# Patient Record
Sex: FEMALE | Race: WHITE | Employment: UNEMPLOYED | URBAN - METROPOLITAN AREA
[De-identification: names, ages, dates, MRNs, and addresses within clinical notes are randomized per-mention and may not be internally consistent; named-entity substitution may affect disease eponyms.]

---

## 2017-08-24 ENCOUNTER — HOSPITAL ENCOUNTER (EMERGENCY)
Age: 57
Discharge: ANOTHER HOSPITAL | End: 2017-08-25
Attending: EMERGENCY MEDICINE
Payer: SELF-PAY

## 2017-08-24 DIAGNOSIS — S92.354A CLOSED NONDISPLACED FRACTURE OF FIFTH METATARSAL BONE OF RIGHT FOOT, INITIAL ENCOUNTER: ICD-10-CM

## 2017-08-24 DIAGNOSIS — S01.311A EAR LOBE LACERATION, RIGHT, INITIAL ENCOUNTER: Primary | ICD-10-CM

## 2017-08-24 DIAGNOSIS — R55 SYNCOPE AND COLLAPSE: ICD-10-CM

## 2017-08-24 PROCEDURE — 96375 TX/PRO/DX INJ NEW DRUG ADDON: CPT

## 2017-08-24 PROCEDURE — 75810000053 HC SPLINT APPLICATION

## 2017-08-24 PROCEDURE — 90471 IMMUNIZATION ADMIN: CPT

## 2017-08-24 PROCEDURE — 96365 THER/PROPH/DIAG IV INF INIT: CPT

## 2017-08-24 PROCEDURE — 96361 HYDRATE IV INFUSION ADD-ON: CPT

## 2017-08-24 PROCEDURE — 99285 EMERGENCY DEPT VISIT HI MDM: CPT

## 2017-08-25 ENCOUNTER — APPOINTMENT (OUTPATIENT)
Dept: GENERAL RADIOLOGY | Age: 57
End: 2017-08-25
Attending: EMERGENCY MEDICINE
Payer: SELF-PAY

## 2017-08-25 ENCOUNTER — APPOINTMENT (OUTPATIENT)
Dept: CT IMAGING | Age: 57
End: 2017-08-25
Attending: EMERGENCY MEDICINE
Payer: SELF-PAY

## 2017-08-25 VITALS
DIASTOLIC BLOOD PRESSURE: 80 MMHG | RESPIRATION RATE: 23 BRPM | BODY MASS INDEX: 23.54 KG/M2 | SYSTOLIC BLOOD PRESSURE: 122 MMHG | WEIGHT: 150 LBS | OXYGEN SATURATION: 97 % | HEART RATE: 72 BPM | HEIGHT: 67 IN | TEMPERATURE: 97.5 F

## 2017-08-25 LAB
ANION GAP SERPL CALC-SCNC: 6 MMOL/L (ref 3–18)
ATRIAL RATE: 74 BPM
BASOPHILS # BLD: 0 K/UL (ref 0–0.06)
BASOPHILS NFR BLD: 1 % (ref 0–2)
BUN SERPL-MCNC: 23 MG/DL (ref 7–18)
BUN/CREAT SERPL: 18 (ref 12–20)
CALCIUM SERPL-MCNC: 9 MG/DL (ref 8.5–10.1)
CALCULATED P AXIS, ECG09: 66 DEGREES
CALCULATED R AXIS, ECG10: 72 DEGREES
CALCULATED T AXIS, ECG11: 66 DEGREES
CHLORIDE SERPL-SCNC: 103 MMOL/L (ref 100–108)
CK MB CFR SERPL CALC: NORMAL % (ref 0–4)
CK MB SERPL-MCNC: <1 NG/ML (ref 5–25)
CK SERPL-CCNC: 114 U/L (ref 26–192)
CO2 SERPL-SCNC: 31 MMOL/L (ref 21–32)
CREAT SERPL-MCNC: 1.25 MG/DL (ref 0.6–1.3)
DIAGNOSIS, 93000: NORMAL
DIFFERENTIAL METHOD BLD: ABNORMAL
EOSINOPHIL # BLD: 0.2 K/UL (ref 0–0.4)
EOSINOPHIL NFR BLD: 3 % (ref 0–5)
ERYTHROCYTE [DISTWIDTH] IN BLOOD BY AUTOMATED COUNT: 13.9 % (ref 11.6–14.5)
GLUCOSE SERPL-MCNC: 136 MG/DL (ref 74–99)
HCT VFR BLD AUTO: 35.3 % (ref 35–45)
HGB BLD-MCNC: 11.5 G/DL (ref 12–16)
LYMPHOCYTES # BLD: 2.1 K/UL (ref 0.9–3.6)
LYMPHOCYTES NFR BLD: 23 % (ref 21–52)
MCH RBC QN AUTO: 31.1 PG (ref 24–34)
MCHC RBC AUTO-ENTMCNC: 32.6 G/DL (ref 31–37)
MCV RBC AUTO: 95.4 FL (ref 74–97)
MONOCYTES # BLD: 0.8 K/UL (ref 0.05–1.2)
MONOCYTES NFR BLD: 9 % (ref 3–10)
NEUTS SEG # BLD: 5.7 K/UL (ref 1.8–8)
NEUTS SEG NFR BLD: 64 % (ref 40–73)
P-R INTERVAL, ECG05: 150 MS
PLATELET # BLD AUTO: 304 K/UL (ref 135–420)
PMV BLD AUTO: 10.4 FL (ref 9.2–11.8)
POTASSIUM SERPL-SCNC: 4.2 MMOL/L (ref 3.5–5.5)
Q-T INTERVAL, ECG07: 402 MS
QRS DURATION, ECG06: 84 MS
QTC CALCULATION (BEZET), ECG08: 446 MS
RBC # BLD AUTO: 3.7 M/UL (ref 4.2–5.3)
SODIUM SERPL-SCNC: 140 MMOL/L (ref 136–145)
TROPONIN I SERPL-MCNC: <0.02 NG/ML (ref 0–0.06)
VENTRICULAR RATE, ECG03: 74 BPM
WBC # BLD AUTO: 8.8 K/UL (ref 4.6–13.2)

## 2017-08-25 PROCEDURE — 90715 TDAP VACCINE 7 YRS/> IM: CPT | Performed by: EMERGENCY MEDICINE

## 2017-08-25 PROCEDURE — 70450 CT HEAD/BRAIN W/O DYE: CPT

## 2017-08-25 PROCEDURE — 73630 X-RAY EXAM OF FOOT: CPT

## 2017-08-25 PROCEDURE — 74011250636 HC RX REV CODE- 250/636: Performed by: EMERGENCY MEDICINE

## 2017-08-25 PROCEDURE — 74011000258 HC RX REV CODE- 258: Performed by: EMERGENCY MEDICINE

## 2017-08-25 PROCEDURE — 82550 ASSAY OF CK (CPK): CPT | Performed by: EMERGENCY MEDICINE

## 2017-08-25 PROCEDURE — 85025 COMPLETE CBC W/AUTO DIFF WBC: CPT | Performed by: EMERGENCY MEDICINE

## 2017-08-25 PROCEDURE — 93005 ELECTROCARDIOGRAM TRACING: CPT

## 2017-08-25 PROCEDURE — 72125 CT NECK SPINE W/O DYE: CPT

## 2017-08-25 PROCEDURE — 80048 BASIC METABOLIC PNL TOTAL CA: CPT | Performed by: EMERGENCY MEDICINE

## 2017-08-25 RX ORDER — SODIUM CHLORIDE 9 MG/ML
1000 INJECTION, SOLUTION INTRAVENOUS ONCE
Status: COMPLETED | OUTPATIENT
Start: 2017-08-25 | End: 2017-08-25

## 2017-08-25 RX ORDER — CEFAZOLIN SODIUM 1 G/3ML
1 INJECTION, POWDER, FOR SOLUTION INTRAMUSCULAR; INTRAVENOUS
Status: DISCONTINUED | OUTPATIENT
Start: 2017-08-25 | End: 2017-08-25

## 2017-08-25 RX ORDER — ONDANSETRON 2 MG/ML
4 INJECTION INTRAMUSCULAR; INTRAVENOUS
Status: COMPLETED | OUTPATIENT
Start: 2017-08-25 | End: 2017-08-25

## 2017-08-25 RX ORDER — MORPHINE SULFATE 4 MG/ML
4 INJECTION, SOLUTION INTRAMUSCULAR; INTRAVENOUS
Status: COMPLETED | OUTPATIENT
Start: 2017-08-25 | End: 2017-08-25

## 2017-08-25 RX ADMIN — CEFAZOLIN SODIUM 1 G: 1 INJECTION, POWDER, FOR SOLUTION INTRAMUSCULAR; INTRAVENOUS at 02:15

## 2017-08-25 RX ADMIN — TETANUS TOXOID, REDUCED DIPHTHERIA TOXOID AND ACELLULAR PERTUSSIS VACCINE, ADSORBED 0.5 ML: 5; 2.5; 8; 8; 2.5 SUSPENSION INTRAMUSCULAR at 00:16

## 2017-08-25 RX ADMIN — Medication 4 MG: at 02:15

## 2017-08-25 RX ADMIN — ONDANSETRON 4 MG: 2 INJECTION INTRAMUSCULAR; INTRAVENOUS at 02:15

## 2017-08-25 RX ADMIN — SODIUM CHLORIDE 1000 ML: 900 INJECTION, SOLUTION INTRAVENOUS at 00:16
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
## 2017-08-25 NOTE — ED NOTES
Patient states that she wishes to have pain medication now, stating pain to right ear and right foot are at a 6/10. Will continue to monitor.

## 2017-08-25 NOTE — ED TRIAGE NOTES
Pt had syncopal episode earlier, fell and injured right ear and right foot. Bleeding controlled at present, large through and through wound noted to top of right ear. Also complaining of right foot pain after fall.

## 2019-07-27 PROBLEM — N81.6 CYSTOCELE WITH RECTOCELE: Status: ACTIVE | Noted: 2019-07-27

## 2019-07-27 PROBLEM — N81.10 CYSTOCELE WITH RECTOCELE: Status: ACTIVE | Noted: 2019-07-27

## 2019-10-16 ENCOUNTER — OFFICE VISIT (OUTPATIENT)
Dept: ORTHOPEDIC SURGERY | Age: 59
End: 2019-10-16

## 2019-10-16 VITALS
WEIGHT: 126 LBS | OXYGEN SATURATION: 100 % | RESPIRATION RATE: 16 BRPM | SYSTOLIC BLOOD PRESSURE: 121 MMHG | HEART RATE: 76 BPM | BODY MASS INDEX: 19.78 KG/M2 | HEIGHT: 67 IN | DIASTOLIC BLOOD PRESSURE: 80 MMHG

## 2019-10-16 DIAGNOSIS — S46.011A TRAUMATIC TEAR OF RIGHT ROTATOR CUFF, UNSPECIFIED TEAR EXTENT, INITIAL ENCOUNTER: Primary | ICD-10-CM

## 2019-10-16 RX ORDER — CYCLOBENZAPRINE HCL 10 MG
10 TABLET ORAL
Qty: 60 TAB | Refills: 0 | Status: SHIPPED | OUTPATIENT
Start: 2019-10-16
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
## 2019-10-18 ENCOUNTER — DOCUMENTATION ONLY (OUTPATIENT)
Dept: ORTHOPEDIC SURGERY | Age: 59
End: 2019-10-18

## 2019-10-28 ENCOUNTER — TELEPHONE (OUTPATIENT)
Dept: ORTHOPEDIC SURGERY | Facility: CLINIC | Age: 59
End: 2019-10-28

## 2019-10-28 RX ORDER — IBUPROFEN 800 MG/1
800 TABLET ORAL
Qty: 90 TAB | Refills: 2 | Status: SHIPPED | OUTPATIENT
Start: 2019-10-28 | End: 2019-11-08 | Stop reason: SDUPTHER

## 2019-10-28 NOTE — TELEPHONE ENCOUNTER
Pt states she is in severe pain and the Flexeril is not helping-it only puts her to sleep. She states she took an 800mg Ibuprofen of her daughter's along with a 500mg Tylenol and that seemed to work. She is requesting an rx for Ibuprofen 800 as she is not wanting anything much stronger. Pt uses [facility]. [place] Please call pt at [phone].

## 2019-10-28 NOTE — TELEPHONE ENCOUNTER
LMOVM to call office back due to generic VM greeting. If/When patient calls back, please make her aware the rx was sent to pharmacy.

## 2019-11-08 ENCOUNTER — OFFICE VISIT (OUTPATIENT)
Dept: ORTHOPEDIC SURGERY | Age: 59
End: 2019-11-08

## 2019-11-08 VITALS
DIASTOLIC BLOOD PRESSURE: 88 MMHG | TEMPERATURE: 95.7 F | HEIGHT: 67 IN | OXYGEN SATURATION: 100 % | WEIGHT: 129.6 LBS | RESPIRATION RATE: 14 BRPM | SYSTOLIC BLOOD PRESSURE: 129 MMHG | HEART RATE: 59 BPM | BODY MASS INDEX: 20.34 KG/M2

## 2019-11-08 DIAGNOSIS — S46.011A TRAUMATIC TEAR OF RIGHT ROTATOR CUFF, UNSPECIFIED TEAR EXTENT, INITIAL ENCOUNTER: ICD-10-CM

## 2019-11-08 DIAGNOSIS — M75.42 IMPINGEMENT SYNDROME OF LEFT SHOULDER: ICD-10-CM

## 2019-11-08 DIAGNOSIS — S46.011D TRAUMATIC COMPLETE TEAR OF RIGHT ROTATOR CUFF, SUBSEQUENT ENCOUNTER: Primary | ICD-10-CM

## 2019-11-08 DIAGNOSIS — M19.111 POST-TRAUMATIC OSTEOARTHRITIS OF RIGHT SHOULDER: ICD-10-CM

## 2019-11-08 RX ORDER — TRAMADOL HYDROCHLORIDE 50 MG/1
50 TABLET ORAL
Qty: 50 TAB | Refills: 0 | Status: SHIPPED | OUTPATIENT
Start: 2019-11-08 | End: 2019-11-15 | Stop reason: SDUPTHER

## 2019-11-08 RX ORDER — IBUPROFEN 800 MG/1
800 TABLET ORAL
Qty: 90 TAB | Refills: 2 | Status: SHIPPED | OUTPATIENT
Start: 2019-11-08 | End: 2019-11-21

## 2019-11-08 NOTE — PROGRESS NOTES
1. Have you been to the ER, urgent care clinic since your last visit? Hospitalized since your last visit? No    2. Have you seen or consulted any other health care providers outside of the [address] [place] [address] since your last visit? Include any pap smears or colon screening.  No
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
## 2019-11-15 ENCOUNTER — TELEPHONE (OUTPATIENT)
Dept: ORTHOPEDIC SURGERY | Age: 59
End: 2019-11-15

## 2019-11-15 DIAGNOSIS — S46.011D TRAUMATIC COMPLETE TEAR OF RIGHT ROTATOR CUFF, SUBSEQUENT ENCOUNTER: Primary | ICD-10-CM

## 2019-11-15 DIAGNOSIS — M67.921 BICEPS TENDINOPATHY, RIGHT: ICD-10-CM

## 2019-11-15 DIAGNOSIS — Z01.812 PRE-OPERATIVE LABORATORY EXAMINATION: ICD-10-CM

## 2019-11-15 DIAGNOSIS — M75.42 IMPINGEMENT SYNDROME OF LEFT SHOULDER: ICD-10-CM

## 2019-11-15 DIAGNOSIS — M19.111 POST-TRAUMATIC OSTEOARTHRITIS OF RIGHT SHOULDER: ICD-10-CM

## 2019-11-15 DIAGNOSIS — S46.011D TRAUMATIC COMPLETE TEAR OF RIGHT ROTATOR CUFF, SUBSEQUENT ENCOUNTER: ICD-10-CM

## 2019-11-15 RX ORDER — TRAMADOL HYDROCHLORIDE 50 MG/1
50 TABLET ORAL
Qty: 20 TAB | Refills: 0 | OUTPATIENT
Start: 2019-11-15 | End: 2019-11-21

## 2019-11-15 NOTE — TELEPHONE ENCOUNTER
rx for #20 Tramadol was approved and sent to [facility] pharmacy. Attempted to contact patient. Had to leave generic VM as there was a generic greeting.

## 2019-11-15 NOTE — TELEPHONE ENCOUNTER
Patient called again regarding the status of her remaining 20 tabs for her prescription. She asked if this can be done today so she can have her medication over the weekend.  Please advise patient at [phone]

## 2019-11-15 NOTE — TELEPHONE ENCOUNTER
Patient called regarding rx for Ultram written 11/8, [facility] only gave patient 30 out of rx written for 50. She's requesting the additional 20 and was hoping to fill before the weekend. A [facility] pharmacy has been added to her file and rx can be called in to them if [facility] cannot fill. Please advise patient at [phone].

## 2019-11-15 NOTE — TELEPHONE ENCOUNTER
Last Visit: 11/8/19 with MD Sutherland  Next Appointment: 12/13/19 with JOSHUA Morel  Previous Refill Encounter(s): 11/8/19 #50 (Pharmacy on filled #28 per )    Requested Prescriptions     Pending Prescriptions Disp Refills    traMADol (ULTRAM) 50 mg tablet 20 Tab 0     Sig: Take 1 Tab by mouth every six (6) hours as needed for Pain for up to 5 days. Max Daily Amount: 200 mg.

## 2019-11-18 NOTE — TELEPHONE ENCOUNTER
Regarding RX:   traMADol (ULTRAM) 50 mg tablet     Preferred Pharmacy:   [facility]/PHARMACY #[id number] - [address]    Patient called very upset stating she was told by a nurse that the refill was sent to [facility] on Friday 11/15. She called [facility] and they never received the RX. I looked at the meds tab and it says it went to [address] [facility]. Patient said she also called [facility] on 11/15 and they never received it either. Patient would like a call back ASAP - she needs the RX sent to the pharmacy listed above.      [phone]

## 2019-11-18 NOTE — TELEPHONE ENCOUNTER
Called [facility] and gave verbal order for the tramadol as ordered.  Attempted to contact patient, but had to leave generic VM as the VM was generic

## 2019-11-20 DIAGNOSIS — S46.011A TRAUMATIC TEAR OF RIGHT ROTATOR CUFF, UNSPECIFIED TEAR EXTENT, INITIAL ENCOUNTER: ICD-10-CM

## 2019-11-21 RX ORDER — CALCIUM CARBONATE 500(1250)
TABLET ORAL DAILY
COMMUNITY

## 2019-11-26 ENCOUNTER — TELEPHONE (OUTPATIENT)
Dept: ORTHOPEDIC SURGERY | Facility: CLINIC | Age: 59
End: 2019-11-26

## 2019-11-26 DIAGNOSIS — S46.011D TRAUMATIC COMPLETE TEAR OF RIGHT ROTATOR CUFF, SUBSEQUENT ENCOUNTER: Primary | ICD-10-CM

## 2019-11-26 RX ORDER — TRAMADOL HYDROCHLORIDE 50 MG/1
50 TABLET ORAL
Qty: 28 TAB | Refills: 0 | Status: SHIPPED | OUTPATIENT
Start: 2019-11-26 | End: 2019-12-03

## 2019-11-26 NOTE — TELEPHONE ENCOUNTER
Patient called checking on status of tramadol refill. I advised it was refilled 11/18/19. She advised she received 20 tablets on 11/18/19 and she has taken them all- she has no more tablets left. She advised she was told to take 1 tablet every 6 hours. I advised would let clinical know.       Patient can be reached: [phone]

## 2019-11-26 NOTE — TELEPHONE ENCOUNTER
rx for tramadol approved. Attempted to contact patient at both numbers provided, and was unable to leave any message at any number.

## 2019-11-26 NOTE — TELEPHONE ENCOUNTER
Patient called and asked if she could have a refill of the traMADol (ULTRAM) 50 mg tablet . and sent to [facility]/PHARMACY #[id number]- [address], [address] Patient states that she is in a lot of pain and having trouble sleeping .  Please advise     Patient tel :[phone]

## 2019-11-27 NOTE — TELEPHONE ENCOUNTER
Prior Auth was submitted. Awaiting results. Patient was called and notified.       Please do not close message

## 2019-11-27 NOTE — TELEPHONE ENCOUNTER
Patient called to advise that [facility] pharmacy just advised her that prior auth is needed for Tramadol refill. She needs rx as soon as possible, please initiate for patient and advise her once at [phone].

## 2019-11-27 NOTE — TELEPHONE ENCOUNTER
Patient called upset about prior auth process stating she doesn't understand, Victoriano Mccain does she have to suffer because of insurance? \". Patient is extremely upset and hung up on me before conversation was finished.

## 2019-11-27 NOTE — TELEPHONE ENCOUNTER
Called and spoke to patient. Advised that I still hadn't heard anything regarding the Prior Auth. Advised that she can pay out of pocket for the medication if she would like.

## 2019-12-02 ENCOUNTER — ANESTHESIA EVENT (OUTPATIENT)
Dept: SURGERY | Age: 59
End: 2019-12-02
Payer: MEDICAID

## 2019-12-03 ENCOUNTER — ANESTHESIA (OUTPATIENT)
Dept: SURGERY | Age: 59
End: 2019-12-03
Payer: MEDICAID

## 2019-12-03 ENCOUNTER — HOSPITAL ENCOUNTER (OUTPATIENT)
Age: 59
Setting detail: OUTPATIENT SURGERY
Discharge: HOME | End: 2019-12-03
Attending: ORTHOPAEDIC SURGERY | Admitting: ORTHOPAEDIC SURGERY
Payer: MEDICAID

## 2019-12-03 VITALS
OXYGEN SATURATION: 93 % | RESPIRATION RATE: 15 BRPM | TEMPERATURE: 97.4 F | BODY MASS INDEX: 20.43 KG/M2 | DIASTOLIC BLOOD PRESSURE: 73 MMHG | SYSTOLIC BLOOD PRESSURE: 103 MMHG | HEART RATE: 60 BPM | WEIGHT: 130.13 LBS | HEIGHT: 67 IN

## 2019-12-03 DIAGNOSIS — G89.18 PAIN FOLLOWING SURGERY OR PROCEDURE: Primary | ICD-10-CM

## 2019-12-03 LAB
AMPHET UR QL SCN: NEGATIVE
BARBITURATES UR QL SCN: POSITIVE
BENZODIAZ UR QL: NEGATIVE
CANNABINOIDS UR QL SCN: POSITIVE
COCAINE UR QL SCN: NEGATIVE
HDSCOM,HDSCOM: ABNORMAL
METHADONE UR QL: NEGATIVE
OPIATES UR QL: NEGATIVE
PCP UR QL: NEGATIVE

## 2019-12-03 PROCEDURE — 76210000021 HC REC RM PH II 0.5 TO 1 HR: Performed by: ORTHOPAEDIC SURGERY

## 2019-12-03 PROCEDURE — 77030004453 HC BUR SHV STRY -B: Performed by: ORTHOPAEDIC SURGERY

## 2019-12-03 PROCEDURE — 77030017964 HC PRB COAG4 STRY -C: Performed by: ORTHOPAEDIC SURGERY

## 2019-12-03 PROCEDURE — 74011000250 HC RX REV CODE- 250: Performed by: NURSE ANESTHETIST, CERTIFIED REGISTERED

## 2019-12-03 PROCEDURE — 77030002919 HC SUT FBRTAPE ARTH -B: Performed by: ORTHOPAEDIC SURGERY

## 2019-12-03 PROCEDURE — 77030013789 HC KT REP BIO TEND ARTH -C: Performed by: ORTHOPAEDIC SURGERY

## 2019-12-03 PROCEDURE — 77030010427: Performed by: ORTHOPAEDIC SURGERY

## 2019-12-03 PROCEDURE — 77030019908 HC STETH ESOPH SIMS -A: Performed by: ANESTHESIOLOGY

## 2019-12-03 PROCEDURE — 74011250636 HC RX REV CODE- 250/636: Performed by: NURSE ANESTHETIST, CERTIFIED REGISTERED

## 2019-12-03 PROCEDURE — C1713 ANCHOR/SCREW BN/BN,TIS/BN: HCPCS | Performed by: ORTHOPAEDIC SURGERY

## 2019-12-03 PROCEDURE — 74011250637 HC RX REV CODE- 250/637: Performed by: NURSE ANESTHETIST, CERTIFIED REGISTERED

## 2019-12-03 PROCEDURE — 74011000272 HC RX REV CODE- 272: Performed by: ORTHOPAEDIC SURGERY

## 2019-12-03 PROCEDURE — 77030002916 HC SUT ETHLN J&J -A: Performed by: ORTHOPAEDIC SURGERY

## 2019-12-03 PROCEDURE — 77030022036 HC BLD SHV TOMCAT STRY -B: Performed by: ORTHOPAEDIC SURGERY

## 2019-12-03 PROCEDURE — 77030040361 HC SLV COMPR DVT MDII -B: Performed by: ORTHOPAEDIC SURGERY

## 2019-12-03 PROCEDURE — 74011250636 HC RX REV CODE- 250/636: Performed by: ANESTHESIOLOGY

## 2019-12-03 PROCEDURE — 77030003666 HC NDL SPINAL BD -A: Performed by: ORTHOPAEDIC SURGERY

## 2019-12-03 PROCEDURE — 74011250636 HC RX REV CODE- 250/636: Performed by: ORTHOPAEDIC SURGERY

## 2019-12-03 PROCEDURE — 77030008574 HC TBNG SUC IRR STRY -B: Performed by: ORTHOPAEDIC SURGERY

## 2019-12-03 PROCEDURE — 76210000006 HC OR PH I REC 0.5 TO 1 HR: Performed by: ORTHOPAEDIC SURGERY

## 2019-12-03 PROCEDURE — 64415 NJX AA&/STRD BRCH PLXS IMG: CPT | Performed by: ANESTHESIOLOGY

## 2019-12-03 PROCEDURE — 77030008683 HC TU ET CUF COVD -A: Performed by: ANESTHESIOLOGY

## 2019-12-03 PROCEDURE — 76942 ECHO GUIDE FOR BIOPSY: CPT | Performed by: ANESTHESIOLOGY

## 2019-12-03 PROCEDURE — 76060000035 HC ANESTHESIA 2 TO 2.5 HR: Performed by: ORTHOPAEDIC SURGERY

## 2019-12-03 PROCEDURE — 77030018836 HC SOL IRR NACL ICUM -A: Performed by: ORTHOPAEDIC SURGERY

## 2019-12-03 PROCEDURE — 77030013079 HC BLNKT BAIR HGGR 3M -A: Performed by: ANESTHESIOLOGY

## 2019-12-03 PROCEDURE — 80307 DRUG TEST PRSMV CHEM ANLYZR: CPT

## 2019-12-03 PROCEDURE — 77030037837: Performed by: ORTHOPAEDIC SURGERY

## 2019-12-03 PROCEDURE — 76010000131 HC OR TIME 2 TO 2.5 HR: Performed by: ORTHOPAEDIC SURGERY

## 2019-12-03 PROCEDURE — 77030019605: Performed by: ORTHOPAEDIC SURGERY

## 2019-12-03 PROCEDURE — 77030040922 HC BLNKT HYPOTHRM STRY -A: Performed by: ORTHOPAEDIC SURGERY

## 2019-12-03 DEVICE — ANCHOR SUT L14.7MM DIA5.5MM 2 NO2 TIGERTAIL FULL THRD: Type: IMPLANTABLE DEVICE | Site: SHOULDER | Status: FUNCTIONAL

## 2019-12-03 DEVICE — ANCHOR SUT L19.1MM DIA7MM BIOCOMPOSITE SWIVELOCK TENODESIS: Type: IMPLANTABLE DEVICE | Site: SHOULDER | Status: FUNCTIONAL

## 2019-12-03 DEVICE — ANCHOR SUTURE BIOCOMP 4.75X19.1 MM SWIVELOCK C: Type: IMPLANTABLE DEVICE | Site: SHOULDER | Status: FUNCTIONAL

## 2019-12-03 RX ORDER — MIDAZOLAM HYDROCHLORIDE 1 MG/ML
INJECTION, SOLUTION INTRAMUSCULAR; INTRAVENOUS
Status: COMPLETED | OUTPATIENT
Start: 2019-12-03 | End: 2019-12-03

## 2019-12-03 RX ORDER — FENTANYL CITRATE 50 UG/ML
INJECTION, SOLUTION INTRAMUSCULAR; INTRAVENOUS
Status: COMPLETED | OUTPATIENT
Start: 2019-12-03 | End: 2019-12-03

## 2019-12-03 RX ORDER — LIDOCAINE HYDROCHLORIDE 10 MG/ML
3 INJECTION, SOLUTION EPIDURAL; INFILTRATION; INTRACAUDAL; PERINEURAL ONCE
Status: COMPLETED | OUTPATIENT
Start: 2019-12-03 | End: 2019-12-03

## 2019-12-03 RX ORDER — DEXAMETHASONE SODIUM PHOSPHATE 4 MG/ML
INJECTION, SOLUTION INTRA-ARTICULAR; INTRALESIONAL; INTRAMUSCULAR; INTRAVENOUS; SOFT TISSUE AS NEEDED
Status: DISCONTINUED | OUTPATIENT
Start: 2019-12-03 | End: 2019-12-03 | Stop reason: HOSPADM

## 2019-12-03 RX ORDER — ROPIVACAINE HYDROCHLORIDE 5 MG/ML
INJECTION, SOLUTION EPIDURAL; INFILTRATION; PERINEURAL
Status: COMPLETED | OUTPATIENT
Start: 2019-12-03 | End: 2019-12-03

## 2019-12-03 RX ORDER — MIDAZOLAM HYDROCHLORIDE 1 MG/ML
2 INJECTION, SOLUTION INTRAMUSCULAR; INTRAVENOUS ONCE
Status: COMPLETED | OUTPATIENT
Start: 2019-12-03 | End: 2019-12-03

## 2019-12-03 RX ORDER — SODIUM CHLORIDE 0.9 % (FLUSH) 0.9 %
5-40 SYRINGE (ML) INJECTION AS NEEDED
Status: CANCELLED | OUTPATIENT
Start: 2019-12-03

## 2019-12-03 RX ORDER — FENTANYL CITRATE 50 UG/ML
50 INJECTION, SOLUTION INTRAMUSCULAR; INTRAVENOUS AS NEEDED
Status: CANCELLED | OUTPATIENT
Start: 2019-12-03

## 2019-12-03 RX ORDER — ONDANSETRON 2 MG/ML
INJECTION INTRAMUSCULAR; INTRAVENOUS AS NEEDED
Status: DISCONTINUED | OUTPATIENT
Start: 2019-12-03 | End: 2019-12-03 | Stop reason: HOSPADM

## 2019-12-03 RX ORDER — PROPOFOL 10 MG/ML
INJECTION, EMULSION INTRAVENOUS AS NEEDED
Status: DISCONTINUED | OUTPATIENT
Start: 2019-12-03 | End: 2019-12-03 | Stop reason: HOSPADM

## 2019-12-03 RX ORDER — ROCURONIUM BROMIDE 10 MG/ML
INJECTION, SOLUTION INTRAVENOUS AS NEEDED
Status: DISCONTINUED | OUTPATIENT
Start: 2019-12-03 | End: 2019-12-03 | Stop reason: HOSPADM

## 2019-12-03 RX ORDER — SUCCINYLCHOLINE CHLORIDE 20 MG/ML
INJECTION INTRAMUSCULAR; INTRAVENOUS AS NEEDED
Status: DISCONTINUED | OUTPATIENT
Start: 2019-12-03 | End: 2019-12-03 | Stop reason: HOSPADM

## 2019-12-03 RX ORDER — SODIUM CHLORIDE 0.9 % (FLUSH) 0.9 %
5-40 SYRINGE (ML) INJECTION EVERY 8 HOURS
Status: CANCELLED | OUTPATIENT
Start: 2019-12-03

## 2019-12-03 RX ORDER — HYDROCODONE BITARTRATE AND ACETAMINOPHEN 5; 325 MG/1; MG/1
1 TABLET ORAL
Qty: 40 TAB | Refills: 0 | Status: SHIPPED | OUTPATIENT
Start: 2019-12-03 | End: 2019-12-04 | Stop reason: SDUPTHER

## 2019-12-03 RX ORDER — SODIUM CHLORIDE 0.9 % (FLUSH) 0.9 %
5-40 SYRINGE (ML) INJECTION EVERY 8 HOURS
Status: DISCONTINUED | OUTPATIENT
Start: 2019-12-03 | End: 2019-12-03 | Stop reason: HOSPADM

## 2019-12-03 RX ORDER — ONDANSETRON 2 MG/ML
4 INJECTION INTRAMUSCULAR; INTRAVENOUS ONCE
Status: CANCELLED | OUTPATIENT
Start: 2019-12-03 | End: 2019-12-03

## 2019-12-03 RX ORDER — SODIUM CHLORIDE 0.9 % (FLUSH) 0.9 %
5-40 SYRINGE (ML) INJECTION AS NEEDED
Status: DISCONTINUED | OUTPATIENT
Start: 2019-12-03 | End: 2019-12-03 | Stop reason: HOSPADM

## 2019-12-03 RX ORDER — FENTANYL CITRATE 50 UG/ML
INJECTION, SOLUTION INTRAMUSCULAR; INTRAVENOUS AS NEEDED
Status: DISCONTINUED | OUTPATIENT
Start: 2019-12-03 | End: 2019-12-03 | Stop reason: HOSPADM

## 2019-12-03 RX ORDER — CEFAZOLIN SODIUM 2 G/50ML
2 SOLUTION INTRAVENOUS
Status: COMPLETED | OUTPATIENT
Start: 2019-12-03 | End: 2019-12-03

## 2019-12-03 RX ORDER — SODIUM CHLORIDE, SODIUM LACTATE, POTASSIUM CHLORIDE, CALCIUM CHLORIDE 600; 310; 30; 20 MG/100ML; MG/100ML; MG/100ML; MG/100ML
75 INJECTION, SOLUTION INTRAVENOUS CONTINUOUS
Status: DISCONTINUED | OUTPATIENT
Start: 2019-12-03 | End: 2019-12-03 | Stop reason: HOSPADM

## 2019-12-03 RX ORDER — HYDROMORPHONE HYDROCHLORIDE 2 MG/ML
1 INJECTION, SOLUTION INTRAMUSCULAR; INTRAVENOUS; SUBCUTANEOUS
Status: CANCELLED | OUTPATIENT
Start: 2019-12-03

## 2019-12-03 RX ORDER — KETOROLAC TROMETHAMINE 15 MG/ML
INJECTION, SOLUTION INTRAMUSCULAR; INTRAVENOUS AS NEEDED
Status: DISCONTINUED | OUTPATIENT
Start: 2019-12-03 | End: 2019-12-03 | Stop reason: HOSPADM

## 2019-12-03 RX ORDER — FENTANYL CITRATE 50 UG/ML
100 INJECTION, SOLUTION INTRAMUSCULAR; INTRAVENOUS ONCE
Status: COMPLETED | OUTPATIENT
Start: 2019-12-03 | End: 2019-12-03

## 2019-12-03 RX ORDER — ROPIVACAINE HYDROCHLORIDE 5 MG/ML
30 INJECTION, SOLUTION EPIDURAL; INFILTRATION; PERINEURAL
Status: COMPLETED | OUTPATIENT
Start: 2019-12-03 | End: 2019-12-03

## 2019-12-03 RX ORDER — DEXMEDETOMIDINE HYDROCHLORIDE 100 UG/ML
INJECTION, SOLUTION INTRAVENOUS AS NEEDED
Status: DISCONTINUED | OUTPATIENT
Start: 2019-12-03 | End: 2019-12-03 | Stop reason: HOSPADM

## 2019-12-03 RX ORDER — FAMOTIDINE 20 MG/1
20 TABLET, FILM COATED ORAL ONCE
Status: COMPLETED | OUTPATIENT
Start: 2019-12-03 | End: 2019-12-03

## 2019-12-03 RX ORDER — EPHEDRINE SULFATE/0.9% NACL/PF 50 MG/5 ML
SYRINGE (ML) INTRAVENOUS AS NEEDED
Status: DISCONTINUED | OUTPATIENT
Start: 2019-12-03 | End: 2019-12-03 | Stop reason: HOSPADM

## 2019-12-03 RX ORDER — LIDOCAINE HYDROCHLORIDE 20 MG/ML
INJECTION, SOLUTION EPIDURAL; INFILTRATION; INTRACAUDAL; PERINEURAL AS NEEDED
Status: DISCONTINUED | OUTPATIENT
Start: 2019-12-03 | End: 2019-12-03 | Stop reason: HOSPADM

## 2019-12-03 RX ADMIN — FENTANYL CITRATE 50 MCG: 50 INJECTION INTRAMUSCULAR; INTRAVENOUS at 09:00

## 2019-12-03 RX ADMIN — DEXMEDETOMIDINE 4 MCG: 100 INJECTION, SOLUTION, CONCENTRATE INTRAVENOUS at 10:07

## 2019-12-03 RX ADMIN — FENTANYL CITRATE 25 MCG: 50 INJECTION INTRAMUSCULAR; INTRAVENOUS at 10:50

## 2019-12-03 RX ADMIN — DEXMEDETOMIDINE 4 MCG: 100 INJECTION, SOLUTION, CONCENTRATE INTRAVENOUS at 10:08

## 2019-12-03 RX ADMIN — CEFAZOLIN 2 G: 10 INJECTION, POWDER, FOR SOLUTION INTRAVENOUS at 09:21

## 2019-12-03 RX ADMIN — FAMOTIDINE 20 MG: 20 TABLET ORAL at 08:31

## 2019-12-03 RX ADMIN — DEXMEDETOMIDINE 8 MCG: 100 INJECTION, SOLUTION, CONCENTRATE INTRAVENOUS at 10:30

## 2019-12-03 RX ADMIN — ONDANSETRON 4 MG: 2 INJECTION INTRAMUSCULAR; INTRAVENOUS at 11:06

## 2019-12-03 RX ADMIN — FENTANYL CITRATE 50 MCG: 50 INJECTION, SOLUTION INTRAMUSCULAR; INTRAVENOUS at 08:56

## 2019-12-03 RX ADMIN — LIDOCAINE HYDROCHLORIDE 2 ML: 10 INJECTION, SOLUTION EPIDURAL; INFILTRATION; INTRACAUDAL; PERINEURAL at 08:58

## 2019-12-03 RX ADMIN — ROCURONIUM BROMIDE 20 MG: 10 INJECTION, SOLUTION INTRAVENOUS at 09:36

## 2019-12-03 RX ADMIN — MIDAZOLAM HYDROCHLORIDE 2 MG: 2 INJECTION, SOLUTION INTRAMUSCULAR; INTRAVENOUS at 09:00

## 2019-12-03 RX ADMIN — DEXMEDETOMIDINE 6 MCG: 100 INJECTION, SOLUTION, CONCENTRATE INTRAVENOUS at 10:32

## 2019-12-03 RX ADMIN — LIDOCAINE HYDROCHLORIDE 100 MG: 20 INJECTION, SOLUTION EPIDURAL; INFILTRATION; INTRACAUDAL; PERINEURAL at 09:13

## 2019-12-03 RX ADMIN — Medication 10 MG: at 09:41

## 2019-12-03 RX ADMIN — DEXMEDETOMIDINE 6 MCG: 100 INJECTION, SOLUTION, CONCENTRATE INTRAVENOUS at 09:21

## 2019-12-03 RX ADMIN — DEXMEDETOMIDINE 6 MCG: 100 INJECTION, SOLUTION, CONCENTRATE INTRAVENOUS at 10:09

## 2019-12-03 RX ADMIN — DEXMEDETOMIDINE 8 MCG: 100 INJECTION, SOLUTION, CONCENTRATE INTRAVENOUS at 10:34

## 2019-12-03 RX ADMIN — PROPOFOL 60 MG: 10 INJECTION, EMULSION INTRAVENOUS at 09:13

## 2019-12-03 RX ADMIN — FENTANYL CITRATE 50 MCG: 50 INJECTION INTRAMUSCULAR; INTRAVENOUS at 11:09

## 2019-12-03 RX ADMIN — DEXMEDETOMIDINE 6 MCG: 100 INJECTION, SOLUTION, CONCENTRATE INTRAVENOUS at 10:45

## 2019-12-03 RX ADMIN — DEXMEDETOMIDINE 4 MCG: 100 INJECTION, SOLUTION, CONCENTRATE INTRAVENOUS at 10:05

## 2019-12-03 RX ADMIN — SODIUM CHLORIDE, SODIUM LACTATE, POTASSIUM CHLORIDE, AND CALCIUM CHLORIDE 75 ML/HR: 600; 310; 30; 20 INJECTION, SOLUTION INTRAVENOUS at 08:31

## 2019-12-03 RX ADMIN — DEXAMETHASONE SODIUM PHOSPHATE 4 MG: 4 INJECTION, SOLUTION INTRAMUSCULAR; INTRAVENOUS at 09:13

## 2019-12-03 RX ADMIN — DEXMEDETOMIDINE 6 MCG: 100 INJECTION, SOLUTION, CONCENTRATE INTRAVENOUS at 10:43

## 2019-12-03 RX ADMIN — MIDAZOLAM 2 MG: 1 INJECTION INTRAMUSCULAR; INTRAVENOUS at 08:55

## 2019-12-03 RX ADMIN — DEXMEDETOMIDINE 4 MCG: 100 INJECTION, SOLUTION, CONCENTRATE INTRAVENOUS at 10:03

## 2019-12-03 RX ADMIN — KETOROLAC TROMETHAMINE 15 MG: 15 INJECTION, SOLUTION INTRAMUSCULAR; INTRAVENOUS at 11:06

## 2019-12-03 RX ADMIN — DEXMEDETOMIDINE 4 MCG: 100 INJECTION, SOLUTION, CONCENTRATE INTRAVENOUS at 10:28

## 2019-12-03 RX ADMIN — DEXMEDETOMIDINE 6 MCG: 100 INJECTION, SOLUTION, CONCENTRATE INTRAVENOUS at 09:13

## 2019-12-03 RX ADMIN — ROPIVACAINE HYDROCHLORIDE 150 MG: 5 INJECTION, SOLUTION EPIDURAL; INFILTRATION; PERINEURAL at 08:59

## 2019-12-03 RX ADMIN — DEXMEDETOMIDINE 8 MCG: 100 INJECTION, SOLUTION, CONCENTRATE INTRAVENOUS at 10:48

## 2019-12-03 RX ADMIN — FENTANYL CITRATE 25 MCG: 50 INJECTION INTRAMUSCULAR; INTRAVENOUS at 11:05

## 2019-12-03 RX ADMIN — ROPIVACAINE HYDROCHLORIDE 30 ML: 5 INJECTION, SOLUTION EPIDURAL; INFILTRATION; PERINEURAL at 09:00

## 2019-12-03 RX ADMIN — Medication 5 MG: at 09:31

## 2019-12-03 RX ADMIN — SUCCINYLCHOLINE CHLORIDE 120 MG: 20 INJECTION, SOLUTION INTRAMUSCULAR; INTRAVENOUS at 09:13

## 2019-12-03 NOTE — INTERVAL H&P NOTE
H&P Update:  Jyoti Lopez was seen and examined. History and physical has been reviewed. The patient has been examined.  There have been no significant clinical changes since the completion of the originally dated History and Physical.

## 2019-12-03 NOTE — ANESTHESIA PREPROCEDURE EVALUATION
Relevant Problems   No relevant active problems       Anesthetic History               Review of Systems / Medical History  Patient summary reviewed and pertinent labs reviewed    Pulmonary    COPD: mild        Asthma : well controlled       Neuro/Psych         Psychiatric history     Cardiovascular    Hypertension: well controlled              Exercise tolerance: >4 METS     GI/Hepatic/Renal     GERD: well controlled           Endo/Other      Hypothyroidism: well controlled  Arthritis     Other Findings              Physical Exam    Airway  Mallampati: III  TM Distance: 4 - 6 cm  Neck ROM: decreased range of motion   Mouth opening: Normal     Cardiovascular    Rhythm: regular  Rate: normal         Dental  No notable dental hx       Pulmonary  Breath sounds clear to auscultation               Abdominal  GI exam deferred       Other Findings            Anesthetic Plan    ASA: 2  Anesthesia type: general and regional - interscalene block          Induction: Intravenous  Anesthetic plan and risks discussed with: Patient

## 2019-12-03 NOTE — DISCHARGE SUMMARY
Patient discharged to phase two recovery per Dr. Hetal Cintron. Patient is asymptomatic with d/c b/p of 85/ . Patient is alert, oriented x 4 denies headache or dizziness.

## 2019-12-03 NOTE — BRIEF OP NOTE
BRIEF OPERATIVE NOTE    Date of Procedure: 12/3/2019   Preoperative Diagnosis: S46.011D RIGHT SHOULDER ROTATOR CUFF TEAR  M67.921 BICEPS TENDIOPATHY  M75.42 IMPINGEMENT  M19.011 RIGHT ACROMIO-CLAVICULAR JOINT DEGENERATIVE JOINT DISEASE  Postoperative Diagnosis: Right rotator cuff tear,impingement,biceps tendinopathy  Procedure(s):  RIGHT SHOULDER ARTHROSCOPIC ROTATOR CUFF REPAIR/BICEPS TENODESIS/SUBACROMIAL DECOMPRESSION//ARTHREX/SPIDER/TMAX/NERVE BLOCK  Surgeon(s) and Role:     * Felicity Sutherland MD - Primary         Surgical Assistant: Sagar Gutierrez [facility]    Surgical Staff:  Circ-1: Carol Ann Nolan RN  Physician Assistant: JOSHUA Martin  Scrub Tech-1: Gurmeet TITUS  Surg Asst-1: Alesha SIMS  Event Time In Time Out   Incision Start 6682    Incision Close       Anesthesia: General   Estimated Blood Loss: Minimal  Specimens: * No specimens in log *   Findings: Right rotator cuff tear, impingement, biceps tendinopathy   Complications: None  Implants:   Implant Name Type Inv.  Item Serial No.  Lot No. LRB No. Used Action   ANCHOR SUT TENODESIS 7X19.1MM -- Nellie Fried - [id number]  ANCHOR SUT TENODESIS 7X19.1MM -- Gee Khan [id number] Right 1 Implanted   ANCHOR C SWIVELOCK 4.75MM - [id number]  ANCHOR C SWIVELOCK 4.75MM  ARTHREX [id number] Right 1 Implanted   ANCHOR SUT FT CRKSCR 5.5MM -- BIOCOMPOSITE - [id number]  ANCHOR SUT FT CRKSCR 5.5MM -- BIOCOMPOSITE  ARTHREX [id number] Right 1 Implanted   ANCHOR SUT FT CRKSCR 5.5MM -- BIOCOMPOSITE - [id number]  ANCHOR SUT FT CRKSCR 5.5MM -- BIOCOMPOSITE  ARTHREX [id number] Right 1 Implanted   ANCHOR C SWIVELOCK 4.75MM - [id number]  Gem Townshend SWIVELOCK 4.75MM  ARTHREX [id number] Right 2 Implanted

## 2019-12-03 NOTE — DISCHARGE INSTRUCTIONS
Acute Pain After Surgery: Care Instructions  Your Care Instructions    It's common to have some pain after surgery. Pain doesn't mean that something is wrong or that the surgery didn't go well. But when the pain is severe, it's important to work with your doctor to manage it. It's also important to be aware of a few facts about pain and pain medicine. · You are the only person who knows what your pain feels like. So be sure to tell your doctor when you are in pain or when the pain changes. Then he or she will know how to adjust your medicines. · Pain is often easier to control right after it starts. So it may be better to take regular doses of pain medicine and not wait until the pain gets bad. · Medicine can help control pain. But this doesn't mean you'll have no pain. Medicine works to keep the pain at a level you can live with. With time, you will feel better. Follow-up care is a key part of your treatment and safety. Be sure to make and go to all appointments, and call your doctor if you are having problems. It's also a good idea to know your test results and keep a list of the medicines you take. How can you care for yourself at home? · Be safe with medicines. Read and follow all instructions on the label. ? If the doctor gave you a prescription medicine for pain, take it as prescribed. ? If you are not taking a prescription pain medicine, ask your doctor if you can take an over-the-counter medicine. · If you take an over-the-counter pain medicine, such as acetaminophen (Tylenol), ibuprofen (Advil, Motrin), or naproxen (Aleve), read and follow all instructions on the label. · Do not take two or more pain medicines at the same time unless the doctor told you to. · Do not drink alcohol while you are taking pain medicines. · Try to walk each day if your doctor recommends it. Start by walking a little more than you did the day before. Bit by bit, increase the amount you walk.  Walking increases blood flow. It also helps prevent pneumonia and constipation. · To prevent constipation from opioid pain medicines:  ? Talk to your doctor about a laxative. ? Include fruits, vegetables, beans, and whole grains in your diet each day. These foods are high in fiber. ? Drink plenty of fluids, enough so that your urine is light yellow or clear like water. Drink water, fruit juice, or other drinks that do not contain caffeine or alcohol. If you have kidney, heart, or liver disease and have to limit fluids, talk with your doctor before you increase the amount of fluids you drink. ? Take a fiber supplement, such as Citrucel or Metamucil, every day if needed. Read and follow all instructions on the label. If you take pain medicine for more than a few days, talk to your doctor before you take fiber. When should you call for help? Call your doctor now or seek immediate medical care if:    · Your pain gets worse.     · Your pain is not controlled by medicine.    Watch closely for changes in your health, and be sure to contact your doctor if you have any problems. Where can you learn more? Go to http://daphne-emmie.info/. Enter ([phone] in the search box to learn more about \"Acute Pain After Surgery: Care Instructions. \"  Current as of: June 26, 2019  Content Version: 12.2  © 2609-2914 [employer]. Care instructions adapted under license by [employer] (which disclaims liability or warranty for this information). If you have questions about a medical condition or this instruction, always ask your healthcare professional. [place] [address] any warranty or liability for your use of this information.     DISCHARGE SUMMARY from Nurse    PATIENT INSTRUCTIONS:    After general anesthesia or intravenous sedation, for 24 hours or while taking prescription Narcotics:  · Limit your activities  · Do not drive and operate hazardous machinery  · Do not make important personal or business decisions  · Do  not drink alcoholic beverages  · If you have not urinated within 8 hours after discharge, please contact your surgeon on call. Report the following to your surgeon:  · Excessive pain, swelling, redness or odor of or around the surgical area  · Temperature over 100.5  · Nausea and vomiting lasting longer than 4 hours or if unable to take medications  · Any signs of decreased circulation or nerve impairment to extremity: change in color, persistent  numbness, tingling, coldness or increase pain  · Any questions    *  Please give a list of your current medications to your Primary Care Provider. *  Please update this list whenever your medications are discontinued, doses are      changed, or new medications (including over-the-counter products) are added. *  Please carry medication information at all times in case of emergency situations. These are general instructions for a healthy lifestyle:    No smoking/ No tobacco products/ Avoid exposure to second hand smoke  Surgeon General's Warning:  Quitting smoking now greatly reduces serious risk to your health. Obesity, smoking, and sedentary lifestyle greatly increases your risk for illness    A healthy diet, regular physical exercise & weight monitoring are important for maintaining a healthy lifestyle    You may be retaining fluid if you have a history of heart failure or if you experience any of the following symptoms:  Weight gain of 3 pounds or more overnight or 5 pounds in a week, increased swelling in our hands or feet or shortness of breath while lying flat in bed. Please call your doctor as soon as you notice any of these symptoms; do not wait until your next office visit. The discharge information has been reviewed with the patient and spouse. The patient and spouse verbalized understanding.   Discharge medications reviewed with the patient and spouse and appropriate educational materials and side effects teaching were provided.   ___________________________________________________________________________________________________________________________________

## 2019-12-03 NOTE — ANESTHESIA PROCEDURE NOTES
Peripheral Block    Start time: 12/3/2019 8:52 AM  End time: 12/3/2019 9:05 AM  Performed by: Emerson Velez MD  Authorized by: Emerson Velez MD       Pre-procedure: Indications: at surgeon's request and post-op pain management    Preanesthetic Checklist: patient identified, risks and benefits discussed, site marked, timeout performed, anesthesia consent given and patient being monitored    Timeout Time: 08:52          Block Type:   Block Type:   Interscalene  Laterality:  Right  Monitoring:  Standard ASA monitoring, responsive to questions, oxygen, continuous pulse ox, frequent vital sign checks and heart rate  Injection Technique:  Single shot  Procedures: ultrasound guided    Patient Position: supine  Prep: chlorhexidine    Location:  Interscalene  Needle Type:  Ultraplex  Needle Gauge:  22 G  Needle Localization:  Ultrasound guidance    Assessment:  Number of attempts:  1  Injection Assessment:  Incremental injection every 5 mL, negative aspiration for CSF, no paresthesia, ultrasound image on chart, low pressure verified by pressure monitor, no intravascular symptoms, negative aspiration for blood and local visualized surrounding nerve on ultrasound  Patient tolerance:  Patient tolerated the procedure well with no immediate complications

## 2019-12-04 DIAGNOSIS — G89.18 PAIN FOLLOWING SURGERY OR PROCEDURE: ICD-10-CM

## 2019-12-04 RX ORDER — HYDROCODONE BITARTRATE AND ACETAMINOPHEN 5; 325 MG/1; MG/1
1 TABLET ORAL
Qty: 40 TAB | Refills: 0 | Status: SHIPPED | OUTPATIENT
Start: 2019-12-04 | End: 2019-12-05 | Stop reason: SDUPTHER

## 2019-12-04 NOTE — ANESTHESIA POSTPROCEDURE EVALUATION
Procedure(s):  RIGHT SHOULDER ARTHROSCOPIC ROTATOR CUFF REPAIR/BICEPS TENODESIS/SUBACROMIAL DECOMPRESSION//ARTHREX/SPIDER/TMAX/NERVE BLOCK.    general, regional    Anesthesia Post Evaluation      Multimodal analgesia: multimodal analgesia used between 6 hours prior to anesthesia start to PACU discharge  Patient location during evaluation: bedside  Patient participation: complete - patient participated  Level of consciousness: awake  Pain management: adequate  Airway patency: patent  Anesthetic complications: no  Cardiovascular status: stable  Respiratory status: acceptable  Hydration status: acceptable  Post anesthesia nausea and vomiting:  controlled      Vitals Value Taken Time   BP 85/52 12/3/2019 12:13 PM   Temp 36.3 °C (97.4 °F) 12/3/2019 11:24 AM   Pulse 60 12/3/2019 12:22 PM   Resp 15 12/3/2019 12:22 PM   SpO2 93 % 12/3/2019 12:22 PM

## 2019-12-04 NOTE — TELEPHONE ENCOUNTER
Called and spoke to pharmacist at [facility]. She states per their rules, they must receive denial from the insurance before they can allow her to pay cash.

## 2019-12-04 NOTE — OP NOTES
[facility]  OPERATIVE REPORT    Name:  Robyn Franco  MR#:   [id number]  :  1960  ACCOUNT #:  [de-identified]  DATE OF SERVICE:  2019    PREOPERATIVE DIAGNOSES:  1. Right rotator cuff tear. 2.  Right shoulder impingement. 3.  Right shoulder biceps tendinopathy. POSTOPERATIVE DIAGNOSES:  1. Right rotator cuff tear. 2.  Right shoulder impingement. 3.  Right shoulder biceps tendinopathy. PROCEDURES PERFORMED:  1. Right shoulder arthroscopic rotator cuff repair. 2.  Right shoulder arthroscopic biceps tenodesis. 3.  Right shoulder arthroscopic subacromial decompression and acromioplasty. SURGEON:  Isabella Chadwick MD    ASSISTANT:  KEVIN Grimaldo PA-C was medically necessary for the procedure. She assisted in patient positioning, rotator cuff repair, decompression, biceps tenodesis, wound closure, placement of dressing and DME, and transfer of the patient to the PACU. ANESTHESIA:  General with nerve block. IV FLUIDS:  700 mL of LR.    COMPLICATIONS:  None. SPECIMENS REMOVED:  None. IMPLANTS:  Arthrex suture anchors. ESTIMATED BLOOD LOSS:  Minimal.    INDICATION FOR PROCEDURE:  The patient is a 61-year-old female who presented to the office with right shoulder pain. The preoperative workup revealed a rotator cuff tear as well as biceps tendinopathy and impingement. After discussing the treatment options at length, she elected to undergo the procedure as described. DESCRIPTION OF PROCEDURE:  The patient was identified in the preoperative holding area. The right shoulder was marked as the operative extremity after confirmation with the patient. After discussing the risks and benefits of the procedure, informed consent was obtained. Anesthesia performed a nerve block. The patient was then taken to the operating room. She was moved from the stretcher to the OR table. General anesthesia was induced, and she was intubated. She was brought into the beach-chair position. The right shoulder was prepped and draped in normal sterile fashion. Time-out was performed verifying the correct patient, procedure, and operative extremity following agreement. Antibiotics were given through the IV prior to skin incision. The surgery began with the posterior portal placement through a stab incision in the soft spot. The arthroscope was brought into the glenohumeral joint. Glenohumeral joint inspection revealed a full-thickness supraspinatus rotator cuff tear extending into the infraspinatus. There was also an upper border subscapularis tear. There was also synovitis noted in the shoulder joint. An anterior portal was made under spinal needle localization in the rotator interval.  The rotator interval was debrided. The biceps tendon was tenotomized for later tenodesis. The upper border of the subscapularis tear was debrided. The footprint was prepared. A FiberTape suture was passed to the upper border of the subscapularis, and it was repaired to the upper border of the lesser tuberosity with a SwiveLock suture anchor. The scope was then brought into the subacromial space. A subacromial bursectomy was performed. An anterolateral acromionectomy and decompression were also performed. This was done with a bur. Anteriorly in the shoulder, the biceps tendon was unroofed from the bicipital groove. It was externalized. The proximal 2 cm of the tendon were excised. A guide pin was placed into the bicipital groove. Over the guide pin, a reamer was used to create a bone socket. Through this bone socket with SwiveLock suture anchor, the biceps tendon was tenodesed. Attention was then turned to the rotator cuff. A lateral and posterolateral portal were made. Through the posterolateral portal while viewing, suture anchors were placed at the articular margin after preparing the greater tuberosity footprint by using a bur.   The sutures from the suture anchors were then passed from posterior to anteriorly through this infraspinatus and supraspinatus rotator cuff tear. They were then tied down sequentially. One tail from each suture was then pulled through two separate lateral row anchors in the proximal lateral humerus completing a double row repair. Final pictures were taken. The shoulder was drained. The scope and instruments were removed. The portal sites were closed. A sterile dressing was placed about the right shoulder. The right arm was then placed into a sling. The patient was awakened from anesthesia and taken to PACU in stable condition with a plan for discharge home.       Aleks Álvarez MD      [id number]  D:  12/03/2019 11:30  T:  12/03/2019 20:59  JOB #:  [id number]

## 2019-12-04 NOTE — TELEPHONE ENCOUNTER
Patient called and begged to speak to a nurse- she wants to know the status of her medication being filled.      Patient can be reached: [phone]

## 2019-12-04 NOTE — TELEPHONE ENCOUNTER
Patient called stating that she had surgery yesterday and had tried to get her prescription filled but was told that she has met her limit for her insurance and it could not be filled. She did end up going to the er for the pain and was told there that she should call the office and see if the doctor could put the prescription as a \"cash plan\". She was not completely sure what that meant and would like a call back as soon as possible.  Please advise patient at [phone]

## 2019-12-04 NOTE — TELEPHONE ENCOUNTER
Post op patient called again and said that the Pharmacy told her they need a Prior Auth for the Hydrocodone medication. Patient said she is in a lot of pain and can not wait a day or so for the Prior Auth. Patient is requesting the Prior Auth be done as soon as possible. Patient is wondering if she could pay cash for the medication. [facility] on [address] [phone]. Patient is requesting a call back at tel. [phone].

## 2019-12-04 NOTE — TELEPHONE ENCOUNTER
Called and spoke to patient. Advised that [facility] currently has our hand tied as they must have a denial of a prior auth before they will allow the rx to be sold for cash. Patients states she may go to the ER for some pain relief. I advised her to not take the [facility] to [facility] or [facility] as they have been giving her trouble regarding her rx.

## 2019-12-05 RX ORDER — HYDROCODONE BITARTRATE AND ACETAMINOPHEN 5; 325 MG/1; MG/1
1 TABLET ORAL
Qty: 40 TAB | Refills: 0 | Status: SHIPPED | OUTPATIENT
Start: 2019-12-05 | End: 2019-12-12

## 2019-12-05 NOTE — TELEPHONE ENCOUNTER
Patient called and asked if her RX could be sent to the [facility] on  [address] [place] [address], [address] , states that she's in pain and doesn't know what to do .        Patient tel : [phone]

## 2019-12-05 NOTE — TELEPHONE ENCOUNTER
I am not sure her insurance will cover this medications even at [facility]. I will send this rx to one more pharmacy but this will be the last one. If its not approved she will need to try and pay out of pocket.

## 2019-12-10 ENCOUNTER — TELEPHONE (OUTPATIENT)
Dept: ORTHOPEDIC SURGERY | Age: 59
End: 2019-12-10

## 2019-12-10 DIAGNOSIS — G89.18 PAIN FOLLOWING SURGERY OR PROCEDURE: ICD-10-CM

## 2019-12-10 RX ORDER — HYDROCODONE BITARTRATE AND ACETAMINOPHEN 7.5; 325 MG/1; MG/1
1 TABLET ORAL
Qty: 28 TAB | Refills: 0 | Status: SHIPPED | OUTPATIENT
Start: 2019-12-10 | End: 2019-12-17 | Stop reason: SDUPTHER

## 2019-12-10 RX ORDER — HYDROCODONE BITARTRATE AND ACETAMINOPHEN 7.5; 325 MG/1; MG/1
1 TABLET ORAL
Qty: 28 TAB | Refills: 0 | Status: SHIPPED | OUTPATIENT
Start: 2019-12-10 | End: 2019-12-10 | Stop reason: SDUPTHER

## 2019-12-10 NOTE — TELEPHONE ENCOUNTER
Patient called and stated that her current Norco prescription is at [facility] and they do not accept her insurance. Patient is requesting prescription to be transferred to [facility] on [address].      Confirmed pharmacy on file as [facility].     Best contact number for patient [phone]

## 2019-12-10 NOTE — TELEPHONE ENCOUNTER
Called and spoke to patient. Advised her that we cannot send her rx to another pharmacy due to [facility] needing denial from a Prior Auth before she is allowed to pay cash. Patient states that she called her insurance and they said they would pay for the rx if there is an authorization from our office. She gave the number [phone] to call. Spoke to ailyn who states she will send the rx to a new pharmacy this last time. I advised patient that if the rx doesn't get covered we cannot send the rx to another pharmacy. Patient was upset and states she \"doesn't understand why we are trying to not give her her medication\" I advised that it is not us, it is the insurance.

## 2019-12-10 NOTE — TELEPHONE ENCOUNTER
Patient called asking for a new RX - she wants stronger pain medication - she advised the [facility] isn't working.      Patient can be reached: [phone]

## 2019-12-10 NOTE — TELEPHONE ENCOUNTER
Received notification that medication needed prior auth. This was completed via cover my meds.  Received approval.

## 2019-12-17 ENCOUNTER — TELEPHONE (OUTPATIENT)
Dept: ORTHOPEDIC SURGERY | Age: 59
End: 2019-12-17

## 2019-12-17 DIAGNOSIS — G89.18 PAIN FOLLOWING SURGERY OR PROCEDURE: ICD-10-CM

## 2019-12-17 RX ORDER — HYDROCODONE BITARTRATE AND ACETAMINOPHEN 7.5; 325 MG/1; MG/1
1 TABLET ORAL
Qty: 28 TAB | Refills: 0 | Status: SHIPPED | OUTPATIENT
Start: 2019-12-17 | End: 2019-12-23 | Stop reason: SDUPTHER

## 2019-12-17 NOTE — TELEPHONE ENCOUNTER
The patient called because she missed a call with no voice mail. She will have her phone to make sure she answers. She states she just needs enough pain medicine to get through to her appt Thursday.       [phone] She will be available

## 2019-12-19 ENCOUNTER — TELEPHONE (OUTPATIENT)
Dept: ORTHOPEDIC SURGERY | Age: 59
End: 2019-12-19

## 2019-12-19 NOTE — TELEPHONE ENCOUNTER
Can she come in today at 1 pm HS, or tues next week HS? The [facility] office is not possible until 1/8 so see if she can do HS.

## 2019-12-19 NOTE — TELEPHONE ENCOUNTER
Patient returned my phone call. She said that she could not come to . I informed her that she would need to come to [facility] Friday and that Dr. Shalini Taveras would be removing her sutures but that that was it, she would still keep the appt on 1/8/19 with Estephania to discuss anything post op. She stated she understood and then I had Annetta Freitas make her an appointment for Friday at [facility] with Dr. Dajuan Ferraro to remove her sutures.

## 2019-12-19 NOTE — TELEPHONE ENCOUNTER
Called the patient and received a generic VM. I left her a message to call back and ask for me Page Catarino) so that I can explain her options of appointments to her. If she calls back I am at [facility] all day today.

## 2019-12-19 NOTE — TELEPHONE ENCOUNTER
Talked with Dr. Daren Albrecht and he will see her for suture removal on Friday. Please let her know to also come in 1/8 for a post op visit to discuss everything. Nothing will change at her visit on Friday, she will just be getting sutures taken out.

## 2019-12-19 NOTE — TELEPHONE ENCOUNTER
Patient called and advised she is having trouble getting to her post op appts b/c she has to put her grandkids on the bus in the morning and pick them up in the afternoon. I offered TITO's next postop opening tomorrow 915 or 945am at [facility] location and she advised that is too far and she is depending on help with transportation. Patient asked for an appt at [facility] but I advised DTARYAN and TITO aren't going to be at [facility] until 1/8/2020. I advised I need to ask clinical what we should do since patient is post op- she mentioned she still has stitches and is worried they will need to be taken out before 1/8/2020.  Patient advised her ideal appt would be at the [facility] location between 11am-3pm.         Patient can be reached: [phone]

## 2019-12-20 ENCOUNTER — OFFICE VISIT (OUTPATIENT)
Dept: ORTHOPEDIC SURGERY | Age: 59
End: 2019-12-20

## 2019-12-20 VITALS
SYSTOLIC BLOOD PRESSURE: 137 MMHG | HEART RATE: 84 BPM | RESPIRATION RATE: 16 BRPM | OXYGEN SATURATION: 98 % | WEIGHT: 133 LBS | DIASTOLIC BLOOD PRESSURE: 84 MMHG | BODY MASS INDEX: 20.88 KG/M2 | HEIGHT: 67 IN

## 2019-12-20 DIAGNOSIS — S46.011D TRAUMATIC COMPLETE TEAR OF RIGHT ROTATOR CUFF, SUBSEQUENT ENCOUNTER: Primary | ICD-10-CM
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
## 2019-12-23 ENCOUNTER — TELEPHONE (OUTPATIENT)
Dept: ORTHOPEDIC SURGERY | Age: 59
End: 2019-12-23

## 2019-12-23 DIAGNOSIS — G89.18 PAIN FOLLOWING SURGERY OR PROCEDURE: ICD-10-CM

## 2019-12-23 RX ORDER — HYDROCODONE BITARTRATE AND ACETAMINOPHEN 7.5; 325 MG/1; MG/1
1 TABLET ORAL
Qty: 28 TAB | Refills: 0 | Status: SHIPPED | OUTPATIENT
Start: 2019-12-23 | End: 2019-12-30 | Stop reason: SDUPTHER

## 2019-12-23 NOTE — TELEPHONE ENCOUNTER
Regarding RX:   HYDROcodone-acetaminophen (NORCO) 7.5-325 mg per tablet     Preferred Pharmacy:    [place], [facility]      Patient called and advised her arm is still throbbing post op- she wanted to know if she could obtain another refill to get her through the holiday season.      Patient can be reached: [phone]

## 2019-12-30 DIAGNOSIS — G89.18 PAIN FOLLOWING SURGERY OR PROCEDURE: ICD-10-CM

## 2019-12-30 RX ORDER — HYDROCODONE BITARTRATE AND ACETAMINOPHEN 7.5; 325 MG/1; MG/1
1 TABLET ORAL
Qty: 28 TAB | Refills: 0 | Status: SHIPPED | OUTPATIENT
Start: 2019-12-30 | End: 2020-01-06

## 2019-12-30 NOTE — TELEPHONE ENCOUNTER
Patient called and requested refill for hydrocodone 7.5    Sx: 12/03/2019  Next appt: 01/08/19    Confirmed pharmacy on file [facility] [phone]    Best contact number for patient [phone]

## 2020-01-06 ENCOUNTER — TELEPHONE (OUTPATIENT)
Dept: ORTHOPEDIC SURGERY | Age: 60
End: 2020-01-06

## 2020-01-06 DIAGNOSIS — G89.18 PAIN FOLLOWING SURGERY OR PROCEDURE: Primary | ICD-10-CM

## 2020-01-06 NOTE — TELEPHONE ENCOUNTER
Patient called back very upset and in extreme pain cannot even use shldr. Patient has done the iceing and Tylenol and other nsaid and nothing is touching her pain. Dr. Mundo Tierney did tell patient she may need pain medication for about 6 weeks. Patient needs any type of pain medication. She needs help today. She appologised for previously statements but the pain is unbearable.  Please call Dr. Mundo Tierney and ask him to call her at [phone]

## 2020-01-06 NOTE — TELEPHONE ENCOUNTER
Called and spoke to patient. Advised her that we are unable to give any medications at this time due to her being over a month out from surgery. Patient stated she is still having pain, and when I advised her that we cannot give medicaitons past one month post op, she said, \"oh you are f**ing kidding me\" and hung up.

## 2020-01-06 NOTE — TELEPHONE ENCOUNTER
Patient called requesting refill for Hydrocodone.   She does have an appointment scheduled for 01/08/20.    [phone]  [facility]

## 2020-01-07 ENCOUNTER — TELEPHONE (OUTPATIENT)
Dept: ORTHOPEDIC SURGERY | Age: 60
End: 2020-01-07

## 2020-01-07 RX ORDER — TRAMADOL HYDROCHLORIDE 50 MG/1
50 TABLET ORAL
Qty: 40 TAB | Refills: 0 | Status: SHIPPED | OUTPATIENT
Start: 2020-01-07 | End: 2020-01-14

## 2020-01-07 NOTE — TELEPHONE ENCOUNTER
Patient called crying uncontrolling regarding previous message, stated she hasn't received a call regarding this. Patient stated she needs help regarding her rx, she cannot sleep at night and she doesn't know to do. Patient is very irritable and used very foul language stated she feels as though she is being ignored . Please advise asap. Informed patient that Dr. Tom Abreu is in sx today.      Patient tel : [phone]

## 2020-01-07 NOTE — TELEPHONE ENCOUNTER
The pharmacy told the patient that \"they need an exact amount for 3 days that the insurance has approved, therefore 18 tablets\". She is in intense pain and she is in need of the full prescription. She is tearful and has not been sleeping at all.       [phone]  [facility] Jaren Bautista     I did not fully understand her request and typed it verbatim for her hoping it makes sense to clinical.    Patient requests a return call from clinical.

## 2020-01-08 ENCOUNTER — OFFICE VISIT (OUTPATIENT)
Dept: ORTHOPEDIC SURGERY | Age: 60
End: 2020-01-08

## 2020-01-08 VITALS
SYSTOLIC BLOOD PRESSURE: 108 MMHG | HEART RATE: 89 BPM | RESPIRATION RATE: 16 BRPM | HEIGHT: 67 IN | DIASTOLIC BLOOD PRESSURE: 77 MMHG | WEIGHT: 132 LBS | OXYGEN SATURATION: 97 % | BODY MASS INDEX: 20.72 KG/M2

## 2020-01-08 DIAGNOSIS — S46.011D TRAUMATIC COMPLETE TEAR OF RIGHT ROTATOR CUFF, SUBSEQUENT ENCOUNTER: Primary | ICD-10-CM

## 2020-01-08 DIAGNOSIS — Z98.890 STATUS POST ARTHROSCOPY OF RIGHT SHOULDER: ICD-10-CM

## 2020-01-08 DIAGNOSIS — G89.18 POST-OP PAIN: ICD-10-CM

## 2020-01-08 RX ORDER — DICLOFENAC SODIUM 10 MG/G
2 GEL TOPICAL 4 TIMES DAILY
Qty: 100 G | Refills: 2 | Status: SHIPPED | OUTPATIENT
Start: 2020-01-08

## 2020-01-08 RX ORDER — HYDROCODONE BITARTRATE AND ACETAMINOPHEN 7.5; 325 MG/1; MG/1
1 TABLET ORAL
Qty: 28 TAB | Refills: 0 | Status: SHIPPED | OUTPATIENT
Start: 2020-01-08 | End: 2020-01-18

## 2020-01-08 NOTE — PROGRESS NOTES
Nicole Gallego  1960     HISTORY OF PRESENT ILLNESS  Nicole Gallego is a 64 y.o. female who presents today for evaluation s/p Right shoulder arthroscopic rotator cuff repair, biceps tenodesis and subacromial decompression on 12/3/19. Patient has not been going to PT. Describes pain as a 10/10. Has been taking nothing for pain. Still has night pain. She has been compliant with her sling only taking it off while sitting at home. Patient denies any fever, chills, chest pain, shortness of breath or calf pain. There are no new illness or injuries to report since last seen in the office. PHYSICAL EXAM:   Visit Vitals  /77   Pulse 89   Resp 16   Ht 5' 7\" (1.702 m)   Wt 132 lb (59.9 kg)   SpO2 97%   BMI 20.67 kg/m²      The patient is a well-developed, well-nourished female in no acute distress. The patient is alert and oriented times three. The patient appears to be well groomed. Mood and affect are normal.  ORTHOPEDIC EXAM of right shoulder:  Inspection: swelling not present,  Bruising not present  Incision well healed  Passive glenohumeral abduction 0-60 degrees, 60 PFF, 0 PER  Stability: Stable  Strength: n/a  2+ distal pulses    IMPRESSION:  s/p Right shoulder arthroscopic rotator cuff repair, biceps tenodesis and subacromial decompression     PLAN:   Pt doing well post operatively  Will D/C sling today. Start PT and exercises today. Demonstrated in the office today. Protocol given small RCR  Stressed to patient that nothing causes an increase in pain. Pt given refill of pain medications. Norco 7.5 mg Patient given pain medication for short term acute pain relief. Goal is to treat patient according to above plan and to ultimately have patient off all pain medications once appropriate. If chronic pain management is required beyond what is expected for current orthopedic problem, will refer patient to pain management.   was reviewed and will be reviewed with every medication refill request.   Pt given volteran gel to help with pain.   RTC 4 weeks    Patient seen and evaluated by Dr. Henrik Leblanc today who agrees with treatment plan    Art[facility]

## 2020-01-10 ENCOUNTER — TELEPHONE (OUTPATIENT)
Dept: ORTHOPEDIC SURGERY | Age: 60
End: 2020-01-10

## 2020-01-10 NOTE — TELEPHONE ENCOUNTER
Patient called and stated that her pharmacy informed her that  she needs a prior auth for her diclofenac (VOLTAREN) 1 % gel      Patient tel : [phone]

## 2020-01-15 ENCOUNTER — APPOINTMENT (OUTPATIENT)
Dept: PHYSICAL THERAPY | Age: 60
End: 2020-01-15
Payer: MEDICAID

## 2020-01-15 ENCOUNTER — TELEPHONE (OUTPATIENT)
Dept: ORTHOPEDIC SURGERY | Age: 60
End: 2020-01-15

## 2020-01-15 RX ORDER — DICLOFENAC SODIUM 50 MG/1
50 TABLET, DELAYED RELEASE ORAL 2 TIMES DAILY WITH MEALS
Qty: 90 TAB | Refills: 2 | Status: SHIPPED | OUTPATIENT
Start: 2020-01-15 | End: 2020-09-01

## 2020-01-15 NOTE — TELEPHONE ENCOUNTER
Her scars will be sensitive so do not put the cream on the scars. I will prescribe an NSAID for her to take twice a day. Please continue to ice her shoulder.

## 2020-01-15 NOTE — TELEPHONE ENCOUNTER
Patient called very upset stating she is unable to afford over the counter creams and did try to use her daughter's cream and it burned her scars. She is having pain and is in need of something to help her pain.  Please advise patient [phone]

## 2020-01-15 NOTE — TELEPHONE ENCOUNTER
Called the patient and gave her Estephania's message. She stated she understood and she would go get that prescription and give it a try.

## 2020-01-16 ENCOUNTER — TELEPHONE (OUTPATIENT)
Dept: ORTHOPEDIC SURGERY | Age: 60
End: 2020-01-16

## 2020-01-16 NOTE — TELEPHONE ENCOUNTER
Pt called very upset and crying stating she is in severe pain and needs something to help alleviate it. She is aware that the Voltaren gel and tablet were denied by he insurance company. I asked her had she tried Aspercreme with Lidocaine as suggested by our clinical staff. She started screaming at me stating she can't afford anything unless it is something her insurance covers. I asked her what was it she wanted me to do for her and she started screaming profanity and hung up on me.

## 2020-01-16 NOTE — TELEPHONE ENCOUNTER
She is now 6 weeks out from her surgery.   Tramadol would be the only medication that she could have prescribed one last time, but I can't guarantee her insurance will cover it

## 2020-01-17 RX ORDER — LIDOCAINE 50 MG/G
1 PATCH TOPICAL EVERY 24 HOURS
Qty: 1 PACKAGE | Refills: 0 | Status: SHIPPED | OUTPATIENT
Start: 2020-01-17

## 2020-01-17 NOTE — TELEPHONE ENCOUNTER
Patient called again upset and crying that she is unable to get a Cream.     Patient said she is in pain. Does not want a pill medication. Patient hung up upset. Patient tel. [phone]. Note : patient said that is why people [facility] their [facility].

## 2020-01-17 NOTE — TELEPHONE ENCOUNTER
Patient called again. She states she is in pain. She does not want the Diclofenac pills, which were approved by insurance. She wants the cream, which requires a prior authorization from insurance, which is not satisfactory. She cannot afford otc cream.  Although not cussing directly at me cussing just the same. I checked with the nurse who said there is really nothing else to offer. She had hung up by the time I returned. No further action required.

## 2020-01-20 ENCOUNTER — HOSPITAL ENCOUNTER (OUTPATIENT)
Dept: PHYSICAL THERAPY | Age: 60
Discharge: HOME | End: 2020-01-20
Payer: MEDICAID

## 2020-01-20 PROCEDURE — 97140 MANUAL THERAPY 1/> REGIONS: CPT | Performed by: PHYSICAL THERAPIST

## 2020-01-20 PROCEDURE — 97162 PT EVAL MOD COMPLEX 30 MIN: CPT | Performed by: PHYSICAL THERAPIST

## 2020-01-20 PROCEDURE — 97110 THERAPEUTIC EXERCISES: CPT | Performed by: PHYSICAL THERAPIST

## 2020-01-20 NOTE — PROGRESS NOTES
PT DAILY TREATMENT NOTE/SHOULDER EVAL 10-18    Patient Name: Celina Mascorro  Date:2020  : 1960  [x]  Patient  Verified  Payor: BLUE CROSS MEDICAID / Plan: [facility] HEALTHKEEPERS PLUS / Product Type: Managed Care Medicaid /    In time:2:30  Out time:3:25  Total Treatment Time (min): 55  Visit #: 1 of 12    Medicare/BCBS Only   Total Timed Codes (min):  30 1:1 Treatment Time:  55       Treatment Area: Strain of muscle(s) and tendon(s) of the rotator cuff of right shoulder, subsequent encounter [S46.011D]    SUBJECTIVE  Pain Level (0-10 scale): 7-8/10 now; 2/10 at best; 9/10. [x]constant []intermittent [x]improving []worsening []no change since onset    Any medication changes, allergies to medications, adverse drug reactions, diagnosis change, or new procedure performed?: [x] No    [] Yes (see summary sheet for update)  Subjective functional status/changes:     PLOF: Independent, no limitations. Was in school for nursing assistant program.  Limitations to PLOF: In a sling now, no active motion allowed. Mechanism of Injury: Had RTC repair, biceps tenodesis, SAD. Current symptoms/Complaints: Constant pain in the right shoulder. Limited in all activity wearing a sling. She is not sleeping with it on and is out of the sling sitting with arm at side. Sleeping in her bed on her right side sometimes. Previous Treatment/Compliance: None  PMHx/Surgical Hx:  1. Right shoulder arthroscopic rotator cuff repair. 2.  Right shoulder arthroscopic biceps tenodesis. 3.  Right shoulder arthroscopic subacromial decompression and acromioplasty. Left Shoulder TSA - 2017  Right and left TKA (left), 2017 (right)    Work Hx: Was not working, but was in school.   Pt Goals: \"I just want to be able to use my arm\"  Barriers: [x]pain [x]financial []time []transportation []other  Cognition: A & O x 3    Other:    OBJECTIVE/EXAMINATION  Domestic Life: Lives with her daughter and son in law and 3 grandchildren. Activity/Recreational Limitations: In a sling, no active motion. Mobility: no deficits with ambulation. Self Care: Needs help with dressing, bathing and grooming. 25 min []Eval                  []Re-Eval       15 min Therapeutic Exercise:  [] See flow sheet :   Rationale: increase ROM and increase strength to improve the patients ability to increase patients ADLs. 15 min Manual Therapy:  Grade I, II GH mobs distraction, inferior glides   Rationale: decrease pain, increase ROM and increase tissue extensibility to increase ease of motion to improve function. With   [] TE   [] TA   [] neuro   [] other: Patient Education: [x] Review HEP    [] Progressed/Changed HEP based on:   [] positioning   [] body mechanics   [] transfers   [] heat/ice application    [] other:        Physical Therapy Evaluation - Shoulder    Posture: [] Poor    [] Fair    [x] Good    Describe:    ROM:  [] Unable to assess at this time                                           AROM                                                              PROM   Left Right  Left Right   Flexion 0-165  Flexion  0-80   Extension   Extension  NT   Scaption/ABD 0-135  Scaption/ABD  70   ER @ 0 Degrees   ER @ 0 Degrees  0   ER @ 90 Degrees 0-70  ER @ 90 Degrees  NT   IR @ 90 Degrees 0-70  IR @ 90 Degrees  NT     End Feel / Painful Arc: Empty end feel. Strength:   [x] Unable to assess at this time                                                                            L (1-5) R (1-5) Pain   Flexors   [] Yes   [] No   Abductors   [] Yes   [] No   External Rotators   [] Yes   [] No   Internal Rotators   [] Yes   [] No   Supraspinatus   [] Yes   [] No   Serratus Anterior   [] Yes   [] No   Lower Trapezius   [] Yes   [] No   Elbow Flexion   [] Yes   [] No   Elbow Extension   [] Yes   [] No       Scapulohumoral Control / Rhythm: N/A  Able to eccentrically lower with good control?  Left: [] Yes   [] No     Right: [] Yes   [] No    Accessory Motions:    Palpation  [] Min  [] Mod  [x] Severe    Location: Anterior GH joint  [x] Min  [] Mod  [] Severe    Location: lateral upper arm  [x] Min  [] Mod  [] Severe    Location: posterior shoulder. Tests:  Deferred at this time. Pain Level (0-10 scale) post treatment: 2    ASSESSMENT/Changes in Function:  Patient with signs and symptoms consistent with right shoulder pain. Patient enters therapy in a sling. She has not been doing any home exercises. She has limited PROM and strength was not assessed. She notes limited use of her right UE and pain which she describes as \"excrutiating\". She notes tenderness to palpation mostly significantly over the anterior shoulder. Patient will continue to benefit from skilled PT services to modify and progress therapeutic interventions, address functional mobility deficits, address ROM deficits, address strength deficits, analyze and address soft tissue restrictions, analyze and cue movement patterns and analyze and modify body mechanics/ergonomics to attain remaining goals. [x]  See Plan of Care  []  See progress note/recertification  []  See Discharge Summary         Progress towards goals / Updated goals:  Short Term Goals: To be accomplished in 3 weeks:  1. Patient will become proficient in their HEP and will be compliant in performing that program.  Evaluation:   Patient given a written/illustrated HEP. 2.  Patient will demonstrate PROM right shoulder flex 0-90, scaption 0-90, IR 0-20, ER 0-20 to increase ease of ADLs. Evaluation:  PROM flex 0-80; scaption 0-70; ER (@ 0 degrees abd) 0.        Long Term Goals: To be accomplished in 6 weeks:  1. Patient's pain level will be 2-5/10 with activity in order to improve patient's ability to perform normal ADLs. Evaluation:  2/10-9/10.  2. Patient will demonstrate AROM right shoulder flex 0-120, scaption 0-90, IR 0-30, ER 0-40 to increase ease of ADLs.   Evaluation:  AROM Left shoulder flex 0-165; Scaption 0-135; IR 0-70; ER 0-70    3. Patient will increase FOTO score to 63 to indicate increased functional mobility. Evaluation:  35  4. Patient will be able to reach to the bottom shelf of a kitchen cabinet in order to perform normal ADLs. Evaluation:  Unable to assess at this time, unable to actively use her right arm per protocol.     PLAN  [x]  Upgrade activities as tolerated     [x]  Continue plan of care  []  Update interventions per flow sheet       []  Discharge due to:_  []  Other:_      Pauline Rollins, PT 1/20/2020  1:58 PM

## 2020-01-20 NOTE — PROGRESS NOTES
[facility] - [facility]              [address]        [place], [address]   [phone] fax    Plan of Care/ Statement of Necessity for Physical Therapy Services  Patient name: Ti Garza Start of Care: 2020   Referral source: Natalie Rogers Paogertrudisma : 1960    Medical Diagnosis: Strain of muscle(s) and tendon(s) of the rotator cuff of right shoulder, subsequent encounter [S46.011D]  Payor: BLUE CROSS MEDICAID / Plan: [employer] [place] / Product Type: Managed Care Medicaid /  Onset Date:12/3/2019    Treatment Diagnosis: Right Shoulder Pain   Prior Hospitalization: see medical history Provider#: [id number]   Medications: Verified on Patient summary List    Comorbidities: Back Pain, Depression, HBP, Headaches, Sleep Dysfunction. Prior Level of Function: Independent. 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 The Plan of Care and following information is based on the information from the initial evaluation. Assessment/ key information: Patient with signs and symptoms consistent with right shoulder pain. Patient enters therapy in a sling. She has not been doing any home exercises. She has limited PROM and strength was not assessed. She notes limited use of her right UE and pain which she describes as \"excrutiating\". She notes tenderness to palpation mostly significantly over the anterior shoulder. Patient will benefit from a program of skilled physical therapy to include therapeutic exercises to address strength deficits, therapeutic activities to improve functional mobility, neuromuscular reeducation to address balance, coordination and proprioception, manual therapy to address ROM and tissue extensibility and modalities as indicated. All questions were answered.     Evaluation Complexity History MEDIUM  Complexity : 1-2 comorbidities / personal factors will impact the outcome/ POC ; Examination MEDIUM Complexity : 3 Standardized tests and measures addressing body structure, function, activity limitation and / or participation in recreation  ;Presentation HIGH Complexity : Unstable and unpredictable characteristics  ; Clinical Decision Making MEDIUM Complexity : FOTO score of 26-74  Overall Complexity Rating: MEDIUM  Problem List: pain affecting function, decrease ROM, decrease strength, decrease ADL/ functional abilitiies, decrease activity tolerance and decrease flexibility/ joint mobility   Treatment Plan may include any combination of the following: Therapeutic exercise, Therapeutic activities, Neuromuscular re-education, Physical agent/modality and Manual therapy  Patient / Family readiness to learn indicated by: asking questions, trying to perform skills and interest  Persons(s) to be included in education: patient (P)  Barriers to Learning/Limitations: None  Patient Goal (s): I just want to be able to use my arm  Patient Self Reported Health Status: poor  Rehabilitation Potential: good    Short Term Goals: To be accomplished in 3 weeks:  1. Patient will become proficient in their HEP and will be compliant in performing that program.  Evaluation:   Patient given a written/illustrated HEP. 2.  Patient will demonstrate PROM right shoulder flex 0-90, scaption 0-90, IR 0-20, ER 0-20 to increase ease of ADLs. Evaluation:  PROM flex 0-80; scaption 0-70; ER (@ 0 degrees abd) 0. Long Term Goals: To be accomplished in 6 weeks:  1. Patient's pain level will be 2-5/10 with activity in order to improve patient's ability to perform normal ADLs. Evaluation:  2/10-9/10.  2. Patient will demonstrate AROM right shoulder flex 0-120, scaption 0-90, IR 0-30, ER 0-40 to increase ease of ADLs. Evaluation:  AROM Left shoulder flex 0-165; Scaption 0-135; IR 0-70; ER 0-70    3. Patient will increase FOTO score to 63 to indicate increased functional mobility. Evaluation:  35  4.  Patient will be able to reach to the bottom shelf of a kitchen cabinet in order to perform normal ADLs. Evaluation:  Unable to assess at this time, unable to actively use her right arm per protocol. Frequency / Duration: Patient to be seen 2 times per week for 6 weeks. Patient/ Caregiver education and instruction: Diagnosis, prognosis, exercises   [x]  Plan of care has been reviewed with PTA Charmel Barthel, PT 1/20/2020 1:57 PM  ________________________________________________________________________    I certify that the above Therapy Services are being furnished while the patient is under my care. I agree with the treatment plan and certify that this therapy is necessary.     Physician's Signature:____________Date:_________TIME:________    ** Signature, Date and Time must be completed for valid certification **  Please sign and return to [facility] - [address], [address]   [phone] fax

## 2020-01-21 ENCOUNTER — DOCUMENTATION ONLY (OUTPATIENT)
Dept: ORTHOPEDIC SURGERY | Facility: CLINIC | Age: 60
End: 2020-01-21

## 2020-01-24 ENCOUNTER — HOSPITAL ENCOUNTER (OUTPATIENT)
Dept: PHYSICAL THERAPY | Age: 60
Discharge: HOME | End: 2020-01-24
Payer: MEDICAID

## 2020-01-24 PROCEDURE — 97140 MANUAL THERAPY 1/> REGIONS: CPT

## 2020-01-24 PROCEDURE — 97110 THERAPEUTIC EXERCISES: CPT

## 2020-01-24 NOTE — PROGRESS NOTES
PT DAILY TREATMENT NOTE 10-18    Patient Name: Kait Ped  Date:2020  : 1960  [x]  Patient  Verified  Payor: BLUE CROSS MEDICAID / Plan: [facility] [employer] HEALTHKEEPERS PLUS / Product Type: Managed Care Medicaid /    In time:2:08  Out time:3:16  Total Treatment Time (min): 46  Visit #: 2 of 12    Medicare/BCBS Only   Total Timed Codes (min):  42 1:1 Treatment Time:  42       Treatment Area: Strain of muscle(s) and tendon(s) of the rotator cuff of right shoulder, subsequent encounter [S46.011D]    SUBJECTIVE  Pain Level (0-10 scale): 8  Any medication changes, allergies to medications, adverse drug reactions, diagnosis change, or new procedure performed?: [x] No    [] Yes (see summary sheet for update)  Subjective functional status/changes:   [] No changes reported  Pt reports her 10year old granddaughter has been moving and stretching her for her. OBJECTIVE    Modality rationale: decrease pain to improve the patients ability to decrease difficulty while performing tasks.     Min Type Additional Details    [] Estim:  []Unatt       []IFC  []Premod                        []Other:  []w/ice   []w/heat  Position:  Location:    [] Estim: []Att    []TENS instruct  []NMES                    []Other:  []w/US   []w/ice   []w/heat  Position:  Location:    []  Traction: [] Cervical       []Lumbar                       [] Prone          []Supine                       []Intermittent   []Continuous Lbs:  [] before manual  [] after manual    []  Ultrasound: []Continuous   [] Pulsed                           []1MHz   []3MHz W/cm2:  Location:    []  Iontophoresis with dexamethasone         Location: [] Take home patch   [] In clinic   10 []  Ice     [x]  heat  []  Ice massage  []  Laser   []  Anodyne Position:sitting  Location:shoulder    []  Laser with stim  []  Other:  Position:  Location:    []  Vasopneumatic Device Pressure:       [] lo [] med [] hi   Temperature: [] lo [] med [] hi   [] Skin assessment post-treatment:  []intact []redness- no adverse reaction    []redness - adverse reaction:       32 min Therapeutic Exercise:  [x] See flow sheet :   Rationale: increase ROM and increase strength to improve the patients ability to increase tolerance to activities. 10 min Manual Therapy:  Sidelying- STM/TPR UT/LS, STJ mobs grade  1-2, supine- PROM in all planes pain free range. Rationale: decrease pain, increase ROM, increase tissue extensibility and decrease trigger points to increase ease with ADLs. With   [] TE   [] TA   [] neuro   [] other: Patient Education: [x] Review HEP    [] Progressed/Changed HEP based on:   [] positioning   [] body mechanics   [] transfers   [] heat/ice application    [] other:      Other Objective/Functional Measures:Pt reports performing HEP. Pain Level (0-10 scale) post treatment: 0    ASSESSMENT/Changes in Function: Initiated exercises per POC. Cues for pt to maintain PROM phase of protocol. Patient will continue to benefit from skilled PT services to modify and progress therapeutic interventions, address functional mobility deficits, address ROM deficits, address strength deficits and analyze and address soft tissue restrictions to attain remaining goals. []  See Plan of Care  []  See progress note/recertification  []  See Discharge Summary         Progress towards goals / Updated goals:  Short Term Goals: To be accomplished in 3 weeks:  1.  Patient will become proficient in their HEP and will be compliant in performing that program.  Evaluation:   Patient given a written/illustrated HEP. Current; Goal met: Pt reports performing HEP. 1/24/2020  2.  Patient will demonstrate PROM right shoulder flex 0-90, scaption 0-90, IR 0-20, ER 0-20 to increase ease of ADLs. Evaluation:  PROM flex 0-80; scaption 0-70; ER (@ 0 degrees abd) 0.        Long Term Goals: To be accomplished in 6 weeks:  1.  Patient's pain level will be 2-5/10 with activity in order to improve patient's ability to perform normal ADLs. Evaluation:  2/10-9/10.  2. Patient will demonstrate AROM right shoulder flex 0-120, scaption 0-90, IR 0-30, ER 0-40 to increase ease of ADLs. Evaluation:  AROM Left shoulder flex 0-165; Scaption 0-135; IR 0-70; ER 0-70    3. Patient will increase FOTO score to 63 to indicate increased functional mobility. Evaluation:  35  4. Patient will be able to reach to the bottom shelf of a kitchen cabinet in order to perform normal ADLs. Evaluation:  Unable to assess at this time, unable to actively use her right arm per protocol.     PLAN  []  Upgrade activities as tolerated     [x]  Continue plan of care  []  Update interventions per flow sheet       []  Discharge due to:_  []  Other:_      Shama Batista PTA 1/24/2020  1:44 PM    Future Appointments   Date Time Provider Robert Guevara   1/24/2020  2:00 PM Tim Wilder, [place] [facility]   1/28/2020  2:00 PM Tim Wilder, [place] [facility]   1/31/2020  1:30 PM Estil Nice, PT [facility]   2/4/2020  1:30 PM Tim Wilder, PTA [facility]   2/7/2020  1:30 PM Estil Nice, PT [facility]   2/11/2020  1:30 PM Tim Wilder, PTA [facility]   2/14/2020  1:30 PM Estil Nice, PT [facility]   2/18/2020  1:30 PM Tim Wilder, PTA [facility]   2/21/2020  1:30 PM Estil Nice, PT [facility]   2/25/2020  1:30 PM Tim Wilder, PTA [facility]   2/28/2020  1:30 PM Estil Nice, PT [facility]   3/3/2020  1:30 PM Tim Wilder, PTA [facility]

## 2020-01-28 ENCOUNTER — HOSPITAL ENCOUNTER (OUTPATIENT)
Dept: PHYSICAL THERAPY | Age: 60
Discharge: HOME | End: 2020-01-28
Payer: MEDICAID

## 2020-01-28 PROCEDURE — 97140 MANUAL THERAPY 1/> REGIONS: CPT

## 2020-01-28 PROCEDURE — 97110 THERAPEUTIC EXERCISES: CPT

## 2020-01-28 NOTE — PROGRESS NOTES
PT DAILY TREATMENT NOTE 10-18    Patient Name: Patrice Cid  Date:2020  : 1960  [x]  Patient  Verified  Payor: BLUE CROSS MEDICAID / Plan: [facility] [employer] / Product Type: Managed Care Medicaid /    In time:2:02  Out time:3:08  Total Treatment Time (min): 66  Visit #: 3 of 12    Medicare/BCBS Only   Total Timed Codes (min):  56 1:1 Treatment Time:  56       Treatment Area: Strain of muscle(s) and tendon(s) of the rotator cuff of right shoulder, subsequent encounter [S46.011D]    SUBJECTIVE  Pain Level (0-10 scale): 3  Any medication changes, allergies to medications, adverse drug reactions, diagnosis change, or new procedure performed?: [x] No    [] Yes (see summary sheet for update)  Subjective functional status/changes:   [] No changes reported  Pt reports that her shoulder just feels stiff today. OBJECTIVE    Modality rationale: decrease pain to improve the patients ability to decrease difficulty while performing tasks.     Min Type Additional Details    [] Estim:  []Unatt       []IFC  []Premod                        []Other:  []w/ice   []w/heat  Position:  Location:    [] Estim: []Att    []TENS instruct  []NMES                    []Other:  []w/US   []w/ice   []w/heat  Position:  Location:    []  Traction: [] Cervical       []Lumbar                       [] Prone          []Supine                       []Intermittent   []Continuous Lbs:  [] before manual  [] after manual    []  Ultrasound: []Continuous   [] Pulsed                           []1MHz   []3MHz W/cm2:  Location:    []  Iontophoresis with dexamethasone         Location: [] Take home patch   [] In clinic   10 []  Ice     [x]  heat  []  Ice massage  []  Laser   []  Anodyne Position:sitting  Location:shoulder    []  Laser with stim  []  Other:  Position:  Location:    []  Vasopneumatic Device Pressure:       [] lo [] med [] hi   Temperature: [] lo [] med [] hi   [] Skin assessment post-treatment:  []intact []redness- no adverse reaction    []redness - adverse reaction:           46 min Therapeutic Exercise:  [x]? See flow sheet :   Rationale: increase ROM and increase strength to improve the patients ability to increase tolerance to activities.      10 min Manual Therapy:  Sidelying- STM/TPR UT/LS, STJ mobs grade  1-2, supine- PROM in all planes pain free range. Rationale: decrease pain, increase ROM, increase tissue extensibility and decrease trigger points to increase ease with ADLs.              With   [] TE   [] TA   [] neuro   [] other: Patient Education: [x] Review HEP    [] Progressed/Changed HEP based on:   [] positioning   [] body mechanics   [] transfers   [] heat/ice application    [] other:      Other Objective/Functional Measures:  pain range 3-6/10. Pain Level (0-10 scale) post treatment: 0    ASSESSMENT/Changes in Function: Initiated AAROM per clearance from [employer]. Pt has poor scapulohumeral rhythm. Pt reported a painful \"pop\" with manual abduction. Pt reports the pain subsided after a few seconds and it did not linger. Patient will continue to benefit from skilled PT services to modify and progress therapeutic interventions, address functional mobility deficits, address ROM deficits, address strength deficits and analyze and address soft tissue restrictions to attain remaining goals. []  See Plan of Care  []  See progress note/recertification  []  See Discharge Summary         Progress towards goals / Updated goals:  Short Term Goals: To be accomplished in 3 weeks:  1.  Patient will become proficient in their HEP and will be compliant in performing that program.  Evaluation:   Patient given a written/illustrated HEP. Current; Goal met: Pt reports performing HEP. 1/24/2020  2.  Patient will demonstrate PROM right shoulder flex 0-90, scaption 0-90, IR 0-20, ER 0-20 to increase ease of ADLs.    Evaluation:  PROM flex 0-80; scaption 0-70; ER (@ 0 degrees abd) 0.        Long Term Goals: To be accomplished in 6 weeks:  1. Patient's pain level will be 2-5/10 with activity in order to improve patient's ability to perform normal ADLs. Evaluation:  2/10-9/10. Current: Progressing: pain range 3-6/10. 1/28/2020  2. Patient will demonstrate AROM right shoulder flex 0-120, scaption 0-90, IR 0-30, ER 0-40 to increase ease of ADLs. Evaluation:  AROM Left shoulder flex 0-165; Scaption 0-135; IR 0-70; ER 0-70    3. Patient will increase FOTO score to 63 to indicate increased functional mobility. Evaluation:  35  4. Patient will be able to reach to the bottom shelf of a kitchen cabinet in order to perform normal ADLs. Evaluation:  Unable to assess at this time, unable to actively use her right arm per protocol.     PLAN  []  Upgrade activities as tolerated     [x]  Continue plan of care  []  Update interventions per flow sheet       []  Discharge due to:_  []  Other:_      Shama Batista PTA 1/28/2020  2:57 PM    Future Appointments   Date Time Provider Robert Guevara   1/31/2020  1:30 PM Tim Wilder, PTA [facility]   2/4/2020  1:30 PM Tim Wilder, PTA [facility]   2/7/2020  1:30 PM Estil Nice, PT [facility]   2/11/2020  1:30 PM Tim Wilder, PTA [facility]   2/14/2020  1:30 PM Estil Nice, PT [facility]   2/18/2020  1:30 PM Tim Wilder, PTA [facility]   2/21/2020  1:30 PM Estil Nice, PT [facility]   2/25/2020  1:30 PM Tim Wilder, PTA [facility]   2/28/2020  1:30 PM Estil Nice, PT [facility]   3/3/2020  1:30 PM Tim Wilder, PTA [facility]

## 2020-01-31 ENCOUNTER — APPOINTMENT (OUTPATIENT)
Dept: PHYSICAL THERAPY | Age: 60
End: 2020-01-31
Payer: MEDICAID

## 2020-02-04 ENCOUNTER — HOSPITAL ENCOUNTER (OUTPATIENT)
Dept: PHYSICAL THERAPY | Age: 60
Discharge: HOME | End: 2020-02-04
Payer: MEDICAID

## 2020-02-04 PROCEDURE — 97112 NEUROMUSCULAR REEDUCATION: CPT

## 2020-02-04 PROCEDURE — 97110 THERAPEUTIC EXERCISES: CPT

## 2020-02-04 PROCEDURE — 97140 MANUAL THERAPY 1/> REGIONS: CPT

## 2020-02-04 NOTE — PROGRESS NOTES
PT DAILY TREATMENT NOTE 10-18    Patient Name: Oleg Celis  Date:2020  : 1960  [x]  Patient  Verified  Payor: BLUE CROSS MEDICAID / Plan: [facility] [employer]KEEPERS PLUS / Product Type: Managed Care Medicaid /    In time:1:35  Out time:2:27  Total Treatment Time (min): 52  Visit #: 4 of 12    Medicare/BCBS Only   Total Timed Codes (min):  52 1:1 Treatment Time:  40       Treatment Area: Strain of muscle(s) and tendon(s) of the rotator cuff of right shoulder, subsequent encounter [S46.011D]    SUBJECTIVE  Pain Level (0-10 scale): 0  Any medication changes, allergies to medications, adverse drug reactions, diagnosis change, or new procedure performed?: [x] No    [] Yes (see summary sheet for update)  Subjective functional status/changes:   [] No changes reported  Pt reports she was playing hide and seek with her grandchildren last week and hit her shoulder on a cabinet.      OBJECTIVE        Min Type Additional Details    [] Estim:  []Unatt       []IFC  []Premod                        []Other:  []w/ice   []w/heat  Position:  Location:    [] Estim: []Att    []TENS instruct  []NMES                    []Other:  []w/US   []w/ice   []w/heat  Position:  Location:    []  Traction: [] Cervical       []Lumbar                       [] Prone          []Supine                       []Intermittent   []Continuous Lbs:  [] before manual  [] after manual    []  Ultrasound: []Continuous   [] Pulsed                           []1MHz   []3MHz W/cm2:  Location:    []  Iontophoresis with dexamethasone         Location: [] Take home patch   [] In clinic    []  Ice     []  heat  []  Ice massage  []  Laser   []  Anodyne Position:  Location:    []  Laser with stim  []  Other:  Position:  Location:    []  Vasopneumatic Device Pressure:       [] lo [] med [] hi   Temperature: [] lo [] med [] hi   [] Skin assessment post-treatment:  []intact []redness- no adverse reaction    []redness  adverse reaction:           27 min Therapeutic Exercise:  [x]? ? See flow sheet :   Rationale: increase ROM and increase strength to improve the patients ability to increase tolerance to activities. 15 min Neuromuscular Re-education:  [x]  See flow sheet :scpaular stabilization exercises. Rationale: increase ROM and increase strength  to improve the patients ability to increase ease with overhead activities.        10 min Manual Therapy:  Sidelying- STM/TPR UT/LS, STJ mobs grade  1-2, supine- PROM in all planes pain free range. Rationale: decrease pain, increase ROM, increase tissue extensibility and decrease trigger points to increase ease with ADLs.             With   [] TE   [] TA   [] neuro   [] other: Patient Education: [x] Review HEP    [] Progressed/Changed HEP based on:   [] positioning   [] body mechanics   [] transfers   [] heat/ice application    [] other:      Other Objective/Functional Measures: PROM flexion 0-145 deg, scaption 0-103 deg, ER ( 0 deg abd) 0-50 deg. Pain Level (0-10 scale) post treatment: 0    ASSESSMENT/Changes in Function: Cues for pt to hold stretch at end range to get a long stretch in her muscles. Pt has progress well with PROM since starting therapy. Patient will continue to benefit from skilled PT services to modify and progress therapeutic interventions, address functional mobility deficits, address ROM deficits, address strength deficits and analyze and address soft tissue restrictions to attain remaining goals. []  See Plan of Care  []  See progress note/recertification  []  See Discharge Summary         Progress towards goals / Updated goals:  Short Term Goals: To be accomplished in 3 weeks:  1.  Patient will become proficient in their HEP and will be compliant in performing that program.  Evaluation:   Patient given a written/illustrated HEP.   Current; Goal met: Pt reports performing HEP. 1/24/2020  2.  Patient will demonstrate PROM right shoulder flex 0-90, scaption 0-90, IR 0-20, ER 0-20 to increase ease of ADLs. Evaluation:  PROM flex 0-80; scaption 0-70; ER (@ 0 degrees abd) 0. Current: Progressing: PROM flexion 0-145 deg, scaption 0-103 deg, ER ( 0 deg abd) 0-50 deg. 2/4/2020        Long Term Goals: To be accomplished in 6 weeks:  1. Patient's pain level will be 2-5/10 with activity in order to improve patient's ability to perform normal ADLs. Evaluation:  2/10-9/10. Current: Progressing: pain range 3-6/10. 1/28/2020  2. Patient will demonstrate AROM right shoulder flex 0-120, scaption 0-90, IR 0-30, ER 0-40 to increase ease of ADLs. Evaluation:  AROM Left shoulder flex 0-165; Scaption 0-135; IR 0-70; ER 0-70    3. Patient will increase FOTO score to 63 to indicate increased functional mobility. Evaluation:  35  4. Patient will be able to reach to the bottom shelf of a kitchen cabinet in order to perform normal ADLs.   Evaluation:  Unable to assess at this time, unable to actively use her right arm per protocol.       PLAN  []  Upgrade activities as tolerated     [x]  Continue plan of care  []  Update interventions per flow sheet       []  Discharge due to:_  []  Other:_      Shanda Brittle, PTA 2/4/2020  1:36 PM    Future Appointments   Date Time Provider Robert Guevara   2/7/2020  1:30 PM Сергей Deleon PT [facility]   2/11/2020  1:30 PM Kizzy Frausto PTA [facility]   2/14/2020  1:30 PM Сергей Deleon PT [facility]   2/18/2020  1:30 PM Kizzy Frausto PTA [facility]   2/21/2020  1:30 PM Сергей Deleon PT [facility]   2/25/2020  1:30 PM Kizzy Frausto PTA [facility]   2/28/2020  1:30 PM Сергей Deleon PT [facility]   3/3/2020  1:30 PM Kizzy Frausto PTA [facility]

## 2020-02-07 ENCOUNTER — HOSPITAL ENCOUNTER (OUTPATIENT)
Dept: PHYSICAL THERAPY | Age: 60
Discharge: HOME | End: 2020-02-07
Payer: MEDICAID

## 2020-02-07 PROCEDURE — 97112 NEUROMUSCULAR REEDUCATION: CPT

## 2020-02-07 PROCEDURE — 97110 THERAPEUTIC EXERCISES: CPT

## 2020-02-07 PROCEDURE — 97140 MANUAL THERAPY 1/> REGIONS: CPT

## 2020-02-07 NOTE — PROGRESS NOTES
PT DAILY TREATMENT NOTE 10-18    Patient Name: Nancy Rizzo  Date:2020  : 1960  [x]  Patient  Verified  Payor: BLUE CROSS MEDICAID / Plan: [facility] [employer] / Product Type: Managed Care Medicaid /    In time:1034  Out time:1122  Total Treatment Time (min): 48  Visit #: 5 of 12    Medicare/BCBS Only   Total Timed Codes (min):  48 1:1 Treatment Time:  44       Treatment Area: Strain of muscle(s) and tendon(s) of the rotator cuff of right shoulder, subsequent encounter [S46.011D]    SUBJECTIVE  Pain Level (0-10 scale): 2  Any medication changes, allergies to medications, adverse drug reactions, diagnosis change, or new procedure performed?: [x] No    [] Yes (see summary sheet for update)  Subjective functional status/changes:   [] No changes reported  Patient reports feeling like her elbow is bruised. OBJECTIVE    28 min Therapeutic Exercise:  [x] See flow sheet : Emphasis placed on improving available shoulder AROM and UE strength   Rationale: increase ROM and increase strength to improve the patients ability to improve ease with overhead ADLs    10 min Neuromuscular Re-education:  [x]  See flow sheet : Emphasis placed on improving activation and recruitment of the glenohumeral and scapulothoracic musculature and improving scapulohumeral rhythm   Rationale: increase ROM, increase strength and increase proprioception  to improve the patients ability to improve ease with overhead lift    10 min Manual Therapy:    Supine, Right GH AP Grade I-II Mobilization (OPP)  Supine, Right GH Lateral Grade II-III Mobilization (OPP)  Supine, Right Shoulder Passive Physiological Grade II-III Mobilization - Flexion/Scaption/Abduction/ER (45 deg abd)/IR (45 deg abd)   Rationale: decrease pain, increase ROM and increase tissue extensibility to improve ease with overhead reach.           With   [] TE   [] TA   [] neuro   [] other: Patient Education: [x] Review HEP    [] Progressed/Changed HEP based on: [] positioning   [] body mechanics   [] transfers   [] heat/ice application    [] other:      Other Objective/Functional Measures:   Flexion 125 deg, Scaption 115 deg, ER (45 deg abd) 35 deg     Pain Level (0-10 scale) post treatment: 0    ASSESSMENT/Changes in Function: Lateral glenohumeral capsular hypomobility demonstrated with poor scapulohumeral dissociation noted with elevation with resultant upper trapezius compensation. Patient benefits from verbal cuing to promote correct form with exercise completion. Patient will continue to benefit from skilled PT services to modify and progress therapeutic interventions, address functional mobility deficits, address ROM deficits, address strength deficits, analyze and address soft tissue restrictions, analyze and cue movement patterns, analyze and modify body mechanics/ergonomics and assess and modify postural abnormalities to attain remaining goals. []  See Plan of Care  []  See progress note/recertification  []  See Discharge Summary         Progress towards goals / Updated goals:    Short Term Goals: To be accomplished in 3 weeks:  1.  Patient will become proficient in their HEP and will be compliant in performing that program.  Evaluation:   Patient given a written/illustrated HEP. Current; Goal met: Pt reports performing HEP. 1/24/2020  2.  Patient will demonstrate PROM right shoulder flex 0-90, scaption 0-90, IR 0-20, ER 0-20 to increase ease of ADLs. Evaluation:  PROM flex 0-80; scaption 0-70; ER (@ 0 degrees abd) 0. Current: Progressing, Flexion 125 deg, Scaption 115 deg, ER (45 deg abd) 35 deg, 2/7/2020        Long Term Goals: To be accomplished in 6 weeks:  1. Patient's pain level will be 2-5/10 with activity in order to improve patient's ability to perform normal ADLs. Evaluation:  2/10-9/10. Current: Progressing: pain range 3-6/10. 1/28/2020  2.  Patient will demonstrate AROM right shoulder flex 0-120, scaption 0-90, IR 0-30, ER 0-40 to increase ease of ADLs. Evaluation:  AROM Left shoulder flex 0-165; Scaption 0-135; IR 0-70; ER 0-70    3. Patient will increase FOTO score to 63 to indicate increased functional mobility. Evaluation:  35  4. Patient will be able to reach to the bottom shelf of a kitchen cabinet in order to perform normal ADLs.   Evaluation:  Unable to assess at this time, unable to actively use her right arm per protocol.       PLAN  [x]  Upgrade activities as tolerated     [x]  Continue plan of care  []  Update interventions per flow sheet       []  Discharge due to:_  []  Other:_      Inge Carpio PT 2/7/2020  6:49 AM    Future Appointments   Date Time Provider Robert Hankinsi   2/7/2020 10:30 AM Rodríguez Ball, PT [facility]   2/11/2020  1:30 PM Nicolas Gess, PTA [facility]   2/14/2020  1:30 PM Jos Guo, PT [facility]   2/18/2020  1:30 PM Nicolas Gess, PTA [facility]   2/21/2020  1:30 PM Jos Guo, PT [facility]   2/25/2020  1:30 PM Nicolas Gess, PTA [facility]   2/28/2020  1:30 PM Jos Guo, PT [facility]   3/3/2020  1:30 PM Nicolas Gess, PTA [facility]

## 2020-02-11 ENCOUNTER — HOSPITAL ENCOUNTER (OUTPATIENT)
Dept: PHYSICAL THERAPY | Age: 60
Discharge: HOME | End: 2020-02-11
Payer: MEDICAID

## 2020-02-11 PROCEDURE — 97112 NEUROMUSCULAR REEDUCATION: CPT

## 2020-02-11 PROCEDURE — 97140 MANUAL THERAPY 1/> REGIONS: CPT

## 2020-02-11 PROCEDURE — 97110 THERAPEUTIC EXERCISES: CPT

## 2020-02-11 NOTE — PROGRESS NOTES
PT DAILY TREATMENT NOTE 10-18    Patient Name: Alejandrina Duncan  Date:2020  : 1960  [x]  Patient  Verified  Payor: BLUE CROSS MEDICAID / Plan: [facility] HEALTHKEEPERS PLUS / Product Type: Managed Care Medicaid /    In time:1:33  Out time:2:15  Total Treatment Time (min): 42  Visit #: 6 of 12    Medicare/BCBS Only   Total Timed Codes (min):  42 1:1 Treatment Time:  38       Treatment Area: Strain of muscle(s) and tendon(s) of the rotator cuff of right shoulder, subsequent encounter [S46.011D]    SUBJECTIVE  Pain Level (0-10 scale): 2  Any medication changes, allergies to medications, adverse drug reactions, diagnosis change, or new procedure performed?: [x] No    [] Yes (see summary sheet for update)  Subjective functional status/changes:   [] No changes reported  Pt reports she has been having a popping feeling in her shoulder with doing her supine wand exercises. Pt reports that she has most of her pain after sleeping due to sleeping on her right side.       OBJECTIVE        Min Type Additional Details    [] Estim:  []Unatt       []IFC  []Premod                        []Other:  []w/ice   []w/heat  Position:  Location:    [] Estim: []Att    []TENS instruct  []NMES                    []Other:  []w/US   []w/ice   []w/heat  Position:  Location:    []  Traction: [] Cervical       []Lumbar                       [] Prone          []Supine                       []Intermittent   []Continuous Lbs:  [] before manual  [] after manual    []  Ultrasound: []Continuous   [] Pulsed                           []1MHz   []3MHz W/cm2:  Location:    []  Iontophoresis with dexamethasone         Location: [] Take home patch   [] In clinic    []  Ice     []  heat  []  Ice massage  []  Laser   []  Anodyne Position:  Location:    []  Laser with stim  []  Other:  Position:  Location:    []  Vasopneumatic Device Pressure:       [] lo [] med [] hi   Temperature: [] lo [] med [] hi   [] Skin assessment post-treatment:  []intact []redness- no adverse reaction    []redness  adverse reaction:         12 min Therapeutic Exercise:  [x]? ?? See flow sheet :   Rationale: increase ROM and increase strength to improve the patients ability to increase tolerance to activities.         15 min Neuromuscular Re-education:  [x]? See flow sheet :scpaular stabilization exercises. Rationale: increase ROM and increase strength  to improve the patients ability to increase ease with overhead activities.         15 min Manual Therapy:  Sidelying- STM/TPR UT/LS, STJ mobs grade  1-2, supine- PROM in all planes pain free range. Rationale: decrease pain, increase ROM, increase tissue extensibility and decrease trigger points to increase ease with ADLs.             With   [] TE   [] TA   [] neuro   [] other: Patient Education: [x] Review HEP    [] Progressed/Changed HEP based on:   [] positioning   [] body mechanics   [] transfers   [] heat/ice application    [] other:      Other Objective/Functional Measures: pain range 1-8/10. Pain Level (0-10 scale) post treatment: 3    ASSESSMENT/Changes in Function: Pt continues to be very limited with PROM in all planes with empty end feel. Pt continues to have the popping and burning feeling throughout session with eccentric motion of exercises. Educated pt to call and make a follow up appointment with her doctor due to having popping and catching feeling ever since running into a cabinet while playing hide and go seek with her grand children. Patient will continue to benefit from skilled PT services to modify and progress therapeutic interventions, address functional mobility deficits, address ROM deficits, address strength deficits and analyze and address soft tissue restrictions to attain remaining goals.      []  See Plan of Care  []  See progress note/recertification  []  See Discharge Summary         Progress towards goals / Updated goals:  Short Term Goals: To be accomplished in 3 weeks:  1.  Patient will become proficient in their HEP and will be compliant in performing that program.  Evaluation:   Patient given a written/illustrated HEP. Current; Goal met: Pt reports performing HEP. 1/24/2020  2.  Patient will demonstrate PROM right shoulder flex 0-90, scaption 0-90, IR 0-20, ER 0-20 to increase ease of ADLs. Evaluation:  PROM flex 0-80; scaption 0-70; ER (@ 0 degrees abd) 0. Current: Progressing, Flexion 125 deg, Scaption 115 deg, ER (45 deg abd) 35 deg, 2/7/2020        Long Term Goals: To be accomplished in 6 weeks:  1. Patient's pain level will be 2-5/10 with activity in order to improve patient's ability to perform normal ADLs. Evaluation:  2/10-9/10. Current: Progressing: pain range 1-8/10. 2/11/2020  2. Patient will demonstrate AROM right shoulder flex 0-120, scaption 0-90, IR 0-30, ER 0-40 to increase ease of ADLs. Evaluation:  AROM Left shoulder flex 0-165; Scaption 0-135; IR 0-70; ER 0-70    3. Patient will increase FOTO score to 63 to indicate increased functional mobility. Evaluation:  35  4. Patient will be able to reach to the bottom shelf of a kitchen cabinet in order to perform normal ADLs. Evaluation:  Unable to assess at this time, unable to actively use her right arm per protocol.     PLAN  []  Upgrade activities as tolerated     [x]  Continue plan of care  []  Update interventions per flow sheet       []  Discharge due to:_  []  Other:_      Trudy Kayser, PTA 2/11/2020  1:38 PM    Future Appointments   Date Time Provider Robert Guevara   2/14/2020  1:30 PM Denzel Knox, PT [facility]   2/18/2020  1:30 PM Nawaf Lopes PTA [facility]   2/21/2020  1:30 PM Denzel Knox, PT [facility]   2/25/2020  1:30 PM Nawaf Lopes, PTA [facility]   2/28/2020  1:30 PM Denzel Knox, PT [facility]   3/3/2020  1:30 PM Nawaf Lopes PTA [facility]

## 2020-02-14 ENCOUNTER — HOSPITAL ENCOUNTER (OUTPATIENT)
Dept: PHYSICAL THERAPY | Age: 60
Discharge: HOME | End: 2020-02-14
Payer: MEDICAID

## 2020-02-14 PROCEDURE — 97112 NEUROMUSCULAR REEDUCATION: CPT

## 2020-02-14 PROCEDURE — 97110 THERAPEUTIC EXERCISES: CPT

## 2020-02-14 PROCEDURE — 97140 MANUAL THERAPY 1/> REGIONS: CPT

## 2020-02-14 NOTE — PROGRESS NOTES
PT DAILY TREATMENT NOTE 10-18    Patient Name: Vinicius Jackson  Date:2020  : 1960  [x]  Patient  Verified  Payor: BLUE CROSS MEDICAID / Plan: [address] [place] [address] / Product Type: Managed Care Medicaid /    In time:1:28  Out time:2:31  Total Treatment Time (min): 63  Visit #: 7 of 12    Medicare/BCBS Only   Total Timed Codes (min):  53 1:1 Treatment Time:  53       Treatment Area: Strain of muscle(s) and tendon(s) of the rotator cuff of right shoulder, subsequent encounter [S46.011D]    SUBJECTIVE  Pain Level (0-10 scale): 1  Any medication changes, allergies to medications, adverse drug reactions, diagnosis change, or new procedure performed?: [x] No    [] Yes (see summary sheet for update)  Subjective functional status/changes:   [] No changes reported  Pt reports she has a follow up appointment on  to discuss the popping in her shoulder. OBJECTIVE    Modality rationale: decrease pain to improve the patients ability to decrease difficulty while performing tasks.     Min Type Additional Details    [] Estim:  []Unatt       []IFC  []Premod                        []Other:  []w/ice   []w/heat  Position:  Location:    [] Estim: []Att    []TENS instruct  []NMES                    []Other:  []w/US   []w/ice   []w/heat  Position:  Location:    []  Traction: [] Cervical       []Lumbar                       [] Prone          []Supine                       []Intermittent   []Continuous Lbs:  [] before manual  [] after manual    []  Ultrasound: []Continuous   [] Pulsed                           []1MHz   []3MHz W/cm2:  Location:    []  Iontophoresis with dexamethasone         Location: [] Take home patch   [] In clinic   10 []  Ice     [x]  heat  []  Ice massage  []  Laser   []  Anodyne Position:sitting  Location:shoulder    []  Laser with stim  []  Other:  Position:  Location:    []  Vasopneumatic Device Pressure:       [] lo [] med [] hi   Temperature: [] lo [] med [] hi   [] Skin assessment post-treatment:  []intact []redness- no adverse reaction    []redness  adverse reaction:           23 min Therapeutic Exercise:  [x]? ??? See flow sheet :   Rationale: increase ROM and increase strength to improve the patients ability to increase tolerance to activities.          15 min Neuromuscular Re-education:  [x]? ?  See flow sheet :scpaular stabilization exercises.    Rationale: increase ROM and increase strength  to improve the patients ability to increase ease with overhead activities.          15 min Manual Therapy:  Sidelying- STM/TPR UT/LS, STJ mobs grade  1-2, supine- PROM in all planes pain free range. Rationale: decrease pain, increase ROM, increase tissue extensibility and decrease trigger points to increase ease with ADLs.                                                        With   [] TE   [] TA   [] neuro   [] other: Patient Education: [x] Review HEP    [] Progressed/Changed HEP based on:   [] positioning   [] body mechanics   [] transfers   [] heat/ice application    [] other:      Other Objective/Functional Measures: pt has pop and pain with each rep of scaption wand. Pain Level (0-10 scale) post treatment: 0    ASSESSMENT/Changes in Function: With performing PROM in flexion and scaption, pt had multiple audible pops in her shoulder. Initiated slight AROM for right shoulder with little difficulty. No popping occurred with AROM exercises. Patient will continue to benefit from skilled PT services to modify and progress therapeutic interventions, address functional mobility deficits, address ROM deficits, address strength deficits and analyze and address soft tissue restrictions to attain remaining goals.      []  See Plan of Care  []  See progress note/recertification  []  See Discharge Summary         Progress towards goals / Updated goals:  Short Term Goals: To be accomplished in 3 weeks:  1.  Patient will become proficient in their HEP and will be compliant in performing that program.  Evaluation:   Patient given a written/illustrated HEP. Current; Goal met: Pt reports performing HEP. 1/24/2020  2.  Patient will demonstrate PROM right shoulder flex 0-90, scaption 0-90, IR 0-20, ER 0-20 to increase ease of ADLs. Evaluation:  PROM flex 0-80; scaption 0-70; ER (@ 0 degrees abd) 0. Current: Progressing, Flexion 125 deg, Scaption 115 deg, ER (45 deg abd) 35 deg, 2/7/2020        Long Term Goals: To be accomplished in 6 weeks:  1. Patient's pain level will be 2-5/10 with activity in order to improve patient's ability to perform normal ADLs. Evaluation:  2/10-9/10. Current: Progressing: pain range 1-8/10. 2/11/2020  2. Patient will demonstrate AROM right shoulder flex 0-120, scaption 0-90, IR 0-30, ER 0-40 to increase ease of ADLs. Evaluation:  AROM Left shoulder flex 0-165; Scaption 0-135; IR 0-70; ER 0-70    3. Patient will increase FOTO score to 63 to indicate increased functional mobility. Evaluation:  35  4. Patient will be able to reach to the bottom shelf of a kitchen cabinet in order to perform normal ADLs.   Evaluation:  Unable to assess at this time, unable to actively use her right arm per protocol.       PLAN  []  Upgrade activities as tolerated     [x]  Continue plan of care  []  Update interventions per flow sheet       []  Discharge due to:_  []  Other:_      Naveen Holley PTA 2/14/2020  1:30 PM    Future Appointments   Date Time Provider Robert Guevara   2/18/2020  1:30 PM Rakesh Palma PTA [facility]   2/19/2020  1:00 PM JOSHUA Ndiaye [facility] SCHED   2/21/2020  1:30 PM Danya Holt PT [facility]   2/25/2020  1:30 PM [facility]akesh Palma PTA [facility]   2/28/2020  1:30 PM Danya Holt PT [facility]   3/3/2020  1:30 PM Rakesh Palma, PTA [facility]

## 2020-02-18 ENCOUNTER — APPOINTMENT (OUTPATIENT)
Dept: PHYSICAL THERAPY | Age: 60
End: 2020-02-18
Payer: MEDICAID

## 2020-02-19 ENCOUNTER — OFFICE VISIT (OUTPATIENT)
Dept: ORTHOPEDIC SURGERY | Age: 60
End: 2020-02-19

## 2020-02-19 VITALS
TEMPERATURE: 98.8 F | SYSTOLIC BLOOD PRESSURE: 143 MMHG | OXYGEN SATURATION: 99 % | BODY MASS INDEX: 21.44 KG/M2 | DIASTOLIC BLOOD PRESSURE: 89 MMHG | WEIGHT: 136.6 LBS | HEIGHT: 67 IN | RESPIRATION RATE: 24 BRPM | HEART RATE: 79 BPM

## 2020-02-19 DIAGNOSIS — S46.011D TRAUMATIC COMPLETE TEAR OF RIGHT ROTATOR CUFF, SUBSEQUENT ENCOUNTER: Primary | ICD-10-CM

## 2020-02-19 DIAGNOSIS — Z98.890 STATUS POST ARTHROSCOPY OF RIGHT SHOULDER: ICD-10-CM

## 2020-02-19 RX ORDER — CALCIUM CARBONATE/VITAMIN D3 600MG-5MCG
1 TABLET ORAL DAILY
COMMUNITY
Start: 2020-02-04

## 2020-02-19 RX ORDER — BUDESONIDE AND FORMOTEROL FUMARATE DIHYDRATE 80; 4.5 UG/1; UG/1
2 AEROSOL RESPIRATORY (INHALATION) 2 TIMES DAILY
COMMUNITY

## 2020-02-19 RX ORDER — MELATONIN
DAILY
COMMUNITY

## 2020-02-19 NOTE — PROGRESS NOTES
Nicole Lyles  1960     HISTORY OF PRESENT ILLNESS  Nicole Lyles is a 64 y.o. female who presents today for evaluation s/p Right shoulder arthroscopic rotator cuff repair, biceps tenodesis and subacromial decompression on 12/3/19. Patient has been going to PT. Describes pain as a 5/10. Has been taking diclofenac for pain. Still has night pain. She has been compliant with her restrictions. She has sharp pain when she is coming down with her arm from a stretch. She also has discomfort in the biceps area. She reports making progress in PT. Her pain does has improved since last visit. She says hot showers and a heating pad help her discomfort. She was a lot further along with her other shoulder and is concerned about this. She also can not afford volteran gel or OTC creams to help with pain. She can not afford to buy OTC pain medications due to a fixed income. Patient denies any fever, chills, chest pain, shortness of breath or calf pain. There are no new illness or injuries to report since last seen in the office. PHYSICAL EXAM:   Visit Vitals  /89 (BP 1 Location: Left arm, BP Patient Position: Sitting)   Pulse 79   Temp 98.8 °F (37.1 °C) (Oral)   Resp 24   Ht 5' 7\" (1.702 m)   Wt 136 lb 9.6 oz (62 kg)   SpO2 99%   BMI 21.39 kg/m²      The patient is a well-developed, well-nourished female in no acute distress. The patient is alert and oriented times three. The patient appears to be well groomed. Mood and affect are normal.  ORTHOPEDIC EXAM of right shoulder:  Inspection: swelling not present,  Bruising not present  Incision well healed  Passive glenohumeral abduction 0-80 degrees, 150 PFF, 50 PER  Stability: Stable  Strength: n/a  2+ distal pulses    IMPRESSION:  s/p Right shoulder arthroscopic rotator cuff repair, biceps tenodesis and subacromial decompression     PLAN:   Pt having pain post operatively  She is likely feeling scar tissue which is causing some of the discomfort.  She is also stiff on exam.  Her passive motion has improved since last visit. Her pain has improved since last visit but only a little. Will continue PT and working on her ROM. Will begin gentle strengthening at this time. All the discomfort she is feeling sounds normal at this point. She is a little stiff on exam which is being worked on in [place]. Given a new PT order today. She will continue Diclofenac, heating pad and hot showers to help with discomfort. Stressed to patient that nothing causes an increase in pain. Pt not given refill of pain medications.   RTC 4 weeks    Patient seen and evaluated by Dr. Hardy Castillo today who agrees with treatment plan    Artur [facility]rr[facility]

## 2020-02-21 ENCOUNTER — APPOINTMENT (OUTPATIENT)
Dept: PHYSICAL THERAPY | Age: 60
End: 2020-02-21
Payer: MEDICAID

## 2020-02-25 ENCOUNTER — HOSPITAL ENCOUNTER (OUTPATIENT)
Dept: PHYSICAL THERAPY | Age: 60
Discharge: HOME | End: 2020-02-25
Payer: MEDICAID

## 2020-02-25 PROCEDURE — 97140 MANUAL THERAPY 1/> REGIONS: CPT

## 2020-02-25 PROCEDURE — 97112 NEUROMUSCULAR REEDUCATION: CPT

## 2020-02-25 PROCEDURE — 97110 THERAPEUTIC EXERCISES: CPT

## 2020-02-25 NOTE — PROGRESS NOTES
[facility]              [address]        [place], [address]   [phone] fax    Progress Note  Patient name: Dania Fontana Start of Care: 2020   Referral source: Rudy Delaney, [address] : 1960   Medical/Treatment Diagnosis: Strain of muscle(s) and tendon(s) of the rotator cuff of right shoulder, subsequent encounter [S46.011D]  Payor: BLUE CROSS MEDICAID / Plan: [address] [place] [address] / Product Type: Managed Care Medicaid /  Onset Date:12/3/2019     Prior Hospitalization: see medical history Provider#: [id number]   Medications: Verified on Patient Summary List    Comorbidities: Back Pain, Depression, HBP, Headaches, Sleep Dysfunction. Prior Level of Function: Independent. 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 Visits from Start of Care: 8    Missed Visits: 3    Established Goals:       Short Term Goals: To be accomplished in 3 weeks:  1.  Patient will become proficient in their HEP and will be compliant in performing that program.  Evaluation:   Patient given a written/illustrated HEP. Current; Goal met: Pt reports performing HEP. 2.  Patient will demonstrate PROM right shoulder flex 0-90, scaption 0-90, IR 0-20, ER 0-20 to increase ease of ADLs. Evaluation:  PROM flex 0-80; scaption 0-70; ER (@ 0 degrees abd) 0. Current: Progressing, Flexion 125 deg, Scaption 115 deg, ER (45 deg abd) 35 deg,         Long Term Goals: To be accomplished in 6 weeks:  1. Patient's pain level will be 2-5/10 with activity in order to improve patient's ability to perform normal ADLs. Evaluation:  2/10-9/10. Current: Progressing: pain range 1-8/10.  2. Patient will demonstrate AROM right shoulder flex 0-120, scaption 0-90, IR 0-30, ER 0-40 to increase ease of ADLs.   Evaluation:  AROM Left shoulder flex 0-165; Scaption 0-135; IR 0-70; ER 0-70  Current: Progressing: AROM right shoulder flexion 0-80 deg, scaption 0-75 deg, functional ER top of head, functional IR = T10      3. Patient will increase FOTO score to 63 to indicate increased functional mobility. Evaluation:  35  Current: Progressing: FOTO = 49.   4. Patient will be able to reach to the bottom shelf of a kitchen cabinet in order to perform normal ADLs. Evaluation:  Unable to assess at this time, unable to actively use her right arm per protocol. Current: Progressing: Pt is able to reach half way to bottom shelf.        Key Functional Changes: see goals above    Updated Goals: continue with unmet goals. ASSESSMENT/RECOMMENDATIONS:  Pt is progressing slowly toward LTGs. Pt continues to have guarded movement with PROM and AROM. Pt reports she recently has been having popping in her shoulder with AROM and when performing AAROM wand at home ever since she ran into a cabinet while playing hide and seek with her grandchildren. Pt stated that at her doctor visit on 2/19/20 the doctor did order a MRI in 4 weeks.       Patient will continue to benefit from skilled PT services to modify and progress therapeutic interventions, address functional mobility deficits, address ROM deficits, address strength deficits and analyze and address soft tissue restrictions to attain remaining goals.          [x]Continue therapy per initial plan/protocol at a frequency of  2 x per week for 6 weeks  []Continue therapy with the following recommended changes:_____________________      _____________________________________________________________________  []Discontinue therapy progressing towards or have reached established goals  []Discontinue therapy due to lack of appreciable progress towards goals  []Discontinue therapy due to lack of attendance or compliance  []Await Physician's recommendations/decisions regarding therapy  []Other:________________________________________________________________    Thank you for this referral.    Darci Mesa, PTA 2/25/2020 2:51 PM  NOTE TO PHYSICIAN: PLEASE COMPLETE THE ORDERS BELOW AND   FAX TO [facility]: [phone]  If you are unable to process this request in 24 hours please contact our office: [phone]    []  I have read the above report and request that my patient continue as recommended. []  I have read the above report and request that my patient continue therapy with the following changes/special instructions:________________________________________  []I have read the above report and request that my patient be discharged from therapy.     [de-identified] Signature:____________Date:_________TIME:________    [employer], Date and Time must be completed for valid certification **

## 2020-02-25 NOTE — PROGRESS NOTES
PT DAILY TREATMENT NOTE 10-18    Patient Name: Kait Ped  Date:2020  : 1960  [x]  Patient  Verified  Payor: BLUE CROSS MEDICAID / Plan: [address] [place] [address] / Product Type: Managed Care Medicaid /    In time:1:33 Out time:2:25  Total Treatment Time (min): 52  Visit #: 1 of 16 (new script)    Medicare/BCBS Only   Total Timed Codes (min):  52 1:1 Treatment Time:  45       Treatment Area: Strain of muscle(s) and tendon(s) of the rotator cuff of right shoulder, subsequent encounter [S46.011D]    SUBJECTIVE  Pain Level (0-10 scale): 0  Any medication changes, allergies to medications, adverse drug reactions, diagnosis change, or new procedure performed?: [x] No    [] Yes (see summary sheet for update)  Subjective functional status/changes:   [] No changes reported  Pt reports that the follow up with her doctor went well, but he did want to order a new MRI in 4 weeks.      OBJECTIVE        Min Type Additional Details    [] Estim:  []Unatt       []IFC  []Premod                        []Other:  []w/ice   []w/heat  Position:  Location:    [] Estim: []Att    []TENS instruct  []NMES                    []Other:  []w/US   []w/ice   []w/heat  Position:  Location:    []  Traction: [] Cervical       []Lumbar                       [] Prone          []Supine                       []Intermittent   []Continuous Lbs:  [] before manual  [] after manual    []  Ultrasound: []Continuous   [] Pulsed                           []1MHz   []3MHz W/cm2:  Location:    []  Iontophoresis with dexamethasone         Location: [] Take home patch   [] In clinic    []  Ice     []  heat  []  Ice massage  []  Laser   []  Anodyne Position:  Location:    []  Laser with stim  []  Other:  Position:  Location:    []  Vasopneumatic Device Pressure:       [] lo [] med [] hi   Temperature: [] lo [] med [] hi   [] Skin assessment post-treatment:  []intact []redness- no adverse reaction    []redness  adverse reaction:              22 min Therapeutic Exercise:  [x]? ???? See flow sheet :   Rationale: increase ROM and increase strength to improve the patients ability to increase tolerance to activities.          15 min Neuromuscular Re-education:  [x]? ??  See flow sheet :scpaular stabilization exercises.    Rationale: increase ROM and increase strength  to improve the patients ability to increase ease with overhead activities.          15 min Manual Therapy:  Sidelying- STM/TPR UT/LS, STJ mobs grade  1-2, supine- PROM in all planes pain free range. Rationale: decrease pain, increase ROM, increase tissue extensibility and decrease trigger points to increase ease with ADLs.                   With   [] TE   [] TA   [] neuro   [] other: Patient Education: [x] Review HEP    [] Progressed/Changed HEP based on:   [] positioning   [] body mechanics   [] transfers   [] heat/ice application    [] other:      Other Objective/Functional Measures: see goals     Pain Level (0-10 scale) post treatment: 0    ASSESSMENT/Changes in Function: Pt is progressing slowly toward LTGs. Pt continues to have guarded movement with PROM and AROM. Pt reports she recently has been having popping in her shoulder with AROM and when performing AAROM wand at home ever since she ran into a cabinet while playing hide and seek with her grand children. Pt stated that at her doctor visit on 2/19/20 the doctor did order a MRI in 4 weeks. Patient will continue to benefit from skilled PT services to modify and progress therapeutic interventions, address functional mobility deficits, address ROM deficits, address strength deficits and analyze and address soft tissue restrictions to attain remaining goals.      []  See Plan of Care  [x]  See progress note/recertification  []  See Discharge Summary         Progress towards goals / Updated goals:  Short Term Goals: To be accomplished in 3 weeks:  1.  Patient will become proficient in their HEP and will be compliant in performing that program.  Evaluation:   Patient given a written/illustrated HEP. Current; Goal met: Pt reports performing HEP. 1/24/2020  2.  Patient will demonstrate PROM right shoulder flex 0-90, scaption 0-90, IR 0-20, ER 0-20 to increase ease of ADLs. Evaluation:  PROM flex 0-80; scaption 0-70; ER (@ 0 degrees abd) 0. Current: Progressing, Flexion 125 deg, Scaption 115 deg, ER (45 deg abd) 35 deg, 2/7/2020        Long Term Goals: To be accomplished in 6 weeks:  1. Patient's pain level will be 2-5/10 with activity in order to improve patient's ability to perform normal ADLs. Evaluation:  2/10-9/10. Current: Progressing: pain range 1-8/10. 2/11/2020  2. Patient will demonstrate AROM right shoulder flex 0-120, scaption 0-90, IR 0-30, ER 0-40 to increase ease of ADLs. Evaluation:  AROM Left shoulder flex 0-165; Scaption 0-135; IR 0-70; ER 0-70  Current: Progressing: AROM right shoulder flexion 0-80 deg, scaption 0-75 deg, functional ER top of head, functional IR = T10 2/25/20     3. Patient will increase FOTO score to 63 to indicate increased functional mobility. Evaluation:  35  Current: Progressing: FOTO = 49. 2/25/20  4. Patient will be able to reach to the bottom shelf of a kitchen cabinet in order to perform normal ADLs. Evaluation:  Unable to assess at this time, unable to actively use her right arm per protocol. Current: Progressing: Pt is able to reach half way to bottom shelf.  2/25/20          PLAN  []  Upgrade activities as tolerated     [x]  Continue plan of care  []  Update interventions per flow sheet       []  Discharge due to:_  []  Other:_      Trudy Kayser, PTA 2/25/2020  1:33 PM    Future Appointments   Date Time Provider Robert Guevara   2/28/2020  1:30 PM Denzel Knox, PT [facility]   3/3/2020  1:30 PM Nawaf Lopes PTA [facility]

## 2020-02-28 ENCOUNTER — HOSPITAL ENCOUNTER (OUTPATIENT)
Dept: PHYSICAL THERAPY | Age: 60
Discharge: HOME | End: 2020-02-28
Payer: MEDICAID

## 2020-02-28 PROCEDURE — 97140 MANUAL THERAPY 1/> REGIONS: CPT | Performed by: PHYSICAL THERAPIST

## 2020-02-28 PROCEDURE — 97112 NEUROMUSCULAR REEDUCATION: CPT | Performed by: PHYSICAL THERAPIST

## 2020-02-28 PROCEDURE — 97110 THERAPEUTIC EXERCISES: CPT | Performed by: PHYSICAL THERAPIST

## 2020-02-28 NOTE — PROGRESS NOTES
PT DAILY TREATMENT NOTE 10-18    Patient Name: Brain Picket  Date:2020  : 1960  [x]  Patient  Verified  Payor: BLUE CROSS MEDICAID / Plan: [facility] [employer] / Product Type: Managed Care Medicaid /    In time:1:37  Out time:2:37  Total Treatment Time (min): 60  Visit #: 2 of 16    Medicare/BCBS Only   Total Timed Codes (min):  60 1:1 Treatment Time:  60       Treatment Area: Strain of muscle(s) and tendon(s) of the rotator cuff of right shoulder, subsequent encounter [S46.011D]    SUBJECTIVE  Pain Level (0-10 scale): 0  Any medication changes, allergies to medications, adverse drug reactions, diagnosis change, or new procedure performed?: [x] No    [] Yes (see summary sheet for update)  Subjective functional status/changes:   [] No changes reported  Patient reports that her shoulder is \"feeling good\". She is sleeping better. Still achy in the morning. OBJECTIVE    30 min Therapeutic Exercise:  [] See flow sheet :   Rationale: increase ROM and increase strength to improve the patients ability to increase her functional activity level. 20 min Neuromuscular Re-education:  []  See flow sheet :   Rationale: increase strength and improve coordination  to improve the patients ability to increase patients ADLs. 10 min Manual Therapy:  GH joint mobs grade II, III distraction; anterior and posterior glides. Rationale: increase ROM and increase tissue extensibility to increase ease of motion to improve function. With   [] TE   [] TA   [] neuro   [] other: Patient Education: [x] Review HEP    [] Progressed/Changed HEP based on:   [] positioning   [] body mechanics   [] transfers   [] heat/ice application    [] other:      Other Objective/Functional Measures: Patient has difficulty with active forward elevation. She demonstrates UT substitution pattern.     Pain Level (0-10 scale) post treatment: 0    ASSESSMENT/Changes in Function: Patient with decreased pain but limited strength and ability to elevate her right arm. Patient will continue to benefit from skilled PT services to modify and progress therapeutic interventions, address functional mobility deficits, address ROM deficits, address strength deficits and analyze and address soft tissue restrictions to attain remaining goals. [x]  See Plan of Care  []  See progress note/recertification  []  See Discharge Summary         Progress towards goals / Updated goals:  Short Term Goals: To be accomplished in 3 weeks:  1.  Patient will become proficient in their HEP and will be compliant in performing that program.  Evaluation:   Patient given a written/illustrated HEP. Current; Goal met: Pt reports performing HEP. 2.  Patient will demonstrate PROM right shoulder flex 0-90, scaption 0-90, IR 0-20, ER 0-20 to increase ease of ADLs. Evaluation:  PROM flex 0-80; scaption 0-70; ER (@ 0 degrees abd) 0. Current: Progressing, Flexion 125 deg, Scaption 115 deg, ER (45 deg abd) 35 deg,         Long Term Goals: To be accomplished in 6 weeks:  1. Patient's pain level will be 2-5/10 with activity in order to improve patient's ability to perform normal ADLs. Evaluation:  2/10-9/10. Current: Progressing: pain range 0-4/10. 2/28/20  2. Patient will demonstrate AROM right shoulder flex 0-120, scaption 0-90, IR 0-30, ER 0-40 to increase ease of ADLs. Evaluation:  AROM Left shoulder flex 0-165; Scaption 0-135; IR 0-70; ER 0-70  Current: Progressing: AROM right shoulder flexion 0-80 deg, scaption 0-75 deg, functional ER top of head, functional IR = T10      3. Patient will increase FOTO score to 63 to indicate increased functional mobility. Evaluation:  35  Current: Progressing: FOTO = 49.   4. Patient will be able to reach to the bottom shelf of a kitchen cabinet in order to perform normal ADLs. Evaluation:  Unable to assess at this time, unable to actively use her right arm per protocol.   Current: Progressing: Pt is able to reach the bottom shelf in therapy cabinet. 2/28/20.       PLAN  [x]  Upgrade activities as tolerated     [x]  Continue plan of care  []  Update interventions per flow sheet       []  Discharge due to:_  []  Other:_      Matteo Parker, PT 2/28/2020  1:47 PM    Future Appointments   Date Time Provider Robert Guevara   3/3/2020  1:30 PM Jessica Mcallister PTA [facility]

## 2020-03-03 ENCOUNTER — HOSPITAL ENCOUNTER (OUTPATIENT)
Dept: PHYSICAL THERAPY | Age: 60
Discharge: HOME | End: 2020-03-03
Payer: MEDICAID

## 2020-03-03 PROCEDURE — 97110 THERAPEUTIC EXERCISES: CPT

## 2020-03-03 PROCEDURE — 97112 NEUROMUSCULAR REEDUCATION: CPT

## 2020-03-03 PROCEDURE — 97140 MANUAL THERAPY 1/> REGIONS: CPT

## 2020-03-09 ENCOUNTER — HOSPITAL ENCOUNTER (OUTPATIENT)
Dept: PHYSICAL THERAPY | Age: 60
Discharge: HOME | End: 2020-03-09
Payer: MEDICAID

## 2020-03-09 PROCEDURE — 97140 MANUAL THERAPY 1/> REGIONS: CPT

## 2020-03-16 ENCOUNTER — HOSPITAL ENCOUNTER (OUTPATIENT)
Dept: PHYSICAL THERAPY | Age: 60
Discharge: HOME | End: 2020-03-16
Payer: MEDICAID

## 2020-03-16 PROCEDURE — 97112 NEUROMUSCULAR REEDUCATION: CPT | Performed by: PHYSICAL THERAPIST

## 2020-03-16 PROCEDURE — 97110 THERAPEUTIC EXERCISES: CPT | Performed by: PHYSICAL THERAPIST

## 2020-03-16 PROCEDURE — 97140 MANUAL THERAPY 1/> REGIONS: CPT | Performed by: PHYSICAL THERAPIST

## 2020-03-16 NOTE — PROGRESS NOTES
PT DAILY TREATMENT NOTE 10-18    Patient Name: Ulises Mcfarland  Date:3/16/2020  : 1960  [x]  Patient  Verified  Payor: BLUE CROSS MEDICAID / Plan: [facility] HEALTHKEEPERS PLUS / Product Type: Managed Care Medicaid /    In time:3:30  Out time:4:38  Total Treatment Time (min): 68  Visit #: 5 of 16    Medicare/BCBS Only   Total Timed Codes (min):  68 1:1 Treatment Time:  60       Treatment Area: Strain of muscle(s) and tendon(s) of the rotator cuff of right shoulder, subsequent encounter [S46.011D]    SUBJECTIVE  Pain Level (0-10 scale): 0  Any medication changes, allergies to medications, adverse drug reactions, diagnosis change, or new procedure performed?: [x] No    [] Yes (see summary sheet for update)  Subjective functional status/changes:   [] No changes reported  Patient reports she has very minimal pain but states her right arm is \"not working right\". OBJECTIVE    33 min Therapeutic Exercise:  [] See flow sheet :Emphasis on increase UE AROM and strength. Rationale: increase ROM and increase strength to improve the patients ability to increase her functional activity level. 23 min Neuromuscular Re-education:  []  See flow sheet : Emphasis on scapular muscle activation and recruitment for functional use of UE. Rationale: increase strength and improve coordination  to improve the patients ability to increase patients ADLs. 12 min Manual Therapy:  GH joint mobs in supine, grade I, II, III. Manual stretching in all planes. Rationale: increase ROM and increase tissue extensibility to increase ease of motion to improve function. With   [] TE   [] TA   [] neuro   [] other: Patient Education: [x] Review HEP    [] Progressed/Changed HEP based on:   [] positioning   [] body mechanics   [] transfers   [] heat/ice application    [] other:      Other Objective/Functional Measures: AROM right shoulder flex 0-80, scaption 0-60.      Pain Level (0-10 scale) post treatment: 0    ASSESSMENT/Changes in Function: Patient with increased pain with activity and her AROM of the right shoulder is diminished since PN with decreased scaption noted. Patient will continue to benefit from skilled PT services to modify and progress therapeutic interventions, address functional mobility deficits, address ROM deficits, address strength deficits and analyze and address soft tissue restrictions to attain remaining goals. [x]? See Plan of Care  []? See progress note/recertification  []? See Discharge Summary         Progress towards goals / Updated goals:  Short Term Goals: To be accomplished in 3 weeks:  1.  Patient will become proficient in their HEP and will be compliant in performing that program.  PN:  Goal met: Pt reports performing HEP. 2.  Patient will demonstrate PROM right shoulder flex 0-90, scaption 0-90, IR 0-20, ER 0-20 to increase ease of ADLs. PN:  Goal met Flexion 125 deg, Scaption 115 deg, ER (45 deg abd) 35 deg,         Long Term Goals: To be accomplished in 6 weeks:  1. Patient's pain level will be 2-5/10 with activity in order to improve patient's ability to perform normal ADLs. PN:  pain range 0-4/10. 2/28/20  Current: Progressing: pain range 0-6/10. 3/9/20  2. Patient will demonstrate AROM right shoulder flex 0-120, scaption 0-90, IR 0-30, ER 0-40 to increase ease of ADLs. PN: AROM right shoulder flexion 0-80 deg, scaption 0-75 deg, functional ER top of head, functional IR = T10   Current: Progressing: AROM right shoulder flexion 0-80 deg, scaption 0-60 deg, functional ER to back of her neck/occiput, functional IR = T10, 3/16/2020  3. Patient will increase FOTO score to 63 to indicate increased functional mobility. PN:  FOTO = 49.   4. Patient will be able to reach to the bottom shelf of a kitchen cabinet in order to perform normal ADLs.   PN:Goal met:   Pt is able to reach the second shelf in therapy cabinet.  3/16/20.      PLAN  [x]  Upgrade activities as tolerated     [x]  Continue plan of care  []  Update interventions per flow sheet       []  Discharge due to:_  []  Other:_      Dhara Vera, PT 3/16/2020  3:39 PM    No future appointments.

## 2020-03-18 ENCOUNTER — OFFICE VISIT (OUTPATIENT)
Dept: ORTHOPEDIC SURGERY | Age: 60
End: 2020-03-18

## 2020-03-18 VITALS
SYSTOLIC BLOOD PRESSURE: 123 MMHG | WEIGHT: 140.4 LBS | DIASTOLIC BLOOD PRESSURE: 82 MMHG | TEMPERATURE: 98.9 F | HEIGHT: 67 IN | HEART RATE: 72 BPM | BODY MASS INDEX: 22.03 KG/M2 | RESPIRATION RATE: 16 BRPM | OXYGEN SATURATION: 100 %

## 2020-03-18 DIAGNOSIS — S46.011D TRAUMATIC COMPLETE TEAR OF RIGHT ROTATOR CUFF, SUBSEQUENT ENCOUNTER: Primary | ICD-10-CM

## 2020-03-18 DIAGNOSIS — Z98.890 STATUS POST ARTHROSCOPY OF RIGHT SHOULDER: ICD-10-CM

## 2020-03-18 NOTE — PROGRESS NOTES
Nicole Carter  1960     HISTORY OF PRESENT ILLNESS  Nicole Carter is a 64 y.o. female who presents today for evaluation s/p Right shoulder arthroscopic rotator cuff repair, biceps tenodesis and subacromial decompression on 12/3/19. Patient has been going to PT. Describes pain as a 1/10. Has been taking diclofenac for pain. Still has night pain. She has been compliant with her restrictions. She has achy pain of the shoulder and she feels stiff. She would like to be further along with her movement. She is going to PT and doing her exercises everyday. Her pain has improved since last visit. She takes occasional OTC pain medication to help. Patient denies any fever, chills, chest pain, shortness of breath or calf pain. There are no new illness or injuries to report since last seen in the office. PHYSICAL EXAM:   Visit Vitals  /82 (BP 1 Location: Left arm, BP Patient Position: Sitting)   Pulse 72   Temp 98.9 °F (37.2 °C) (Oral)   Resp 16   Ht 5' 7\" (1.702 m)   Wt 140 lb 6.4 oz (63.7 kg)   SpO2 100%   BMI 21.99 kg/m²      The patient is a well-developed, well-nourished female in no acute distress. The patient is alert and oriented times three. The patient appears to be well groomed. Mood and affect are normal.  ORTHOPEDIC EXAM of right shoulder:  Inspection: swelling not present,  Bruising not present  Incision well healed  Passive glenohumeral abduction 0-80 degrees, 170 PFF, 50 PER, 80 AFF, IR lower lumber  Stability: Stable  Strength: n/a  2+ distal pulses    IMPRESSION:  s/p Right shoulder arthroscopic rotator cuff repair, biceps tenodesis and subacromial decompression     PLAN:   Pt doing well post operatively  Her passive motion has improved since last visit. Will continue PT and working on her ROM. Will continue gentle strengthening at this time. All the discomfort she is feeling sounds normal at this point. She is a little stiff on exam but this has improved since last visit.    Her strength at this point looks good. She would like to be further along but I ask she be patient. This will all continue to improve with PT. She will continue Diclofenac, heating pad and hot showers to help with discomfort. Stressed to patient that nothing causes an increase in pain. Pt not given refill of pain medications.   RTC 6 weeks    Patient seen and evaluated by Dr. Mary Ramirez today who agrees with treatment plan    Artur Boateng [facility]

## 2020-03-20 ENCOUNTER — HOSPITAL ENCOUNTER (OUTPATIENT)
Dept: PHYSICAL THERAPY | Age: 60
Discharge: HOME | End: 2020-03-20
Payer: MEDICAID

## 2020-03-20 PROCEDURE — 97110 THERAPEUTIC EXERCISES: CPT | Performed by: PHYSICAL THERAPIST

## 2020-03-20 PROCEDURE — 97112 NEUROMUSCULAR REEDUCATION: CPT | Performed by: PHYSICAL THERAPIST

## 2020-03-20 PROCEDURE — 97140 MANUAL THERAPY 1/> REGIONS: CPT | Performed by: PHYSICAL THERAPIST

## 2020-03-20 NOTE — PROGRESS NOTES
PT DAILY TREATMENT NOTE 10-18    Patient Name: Dania Fontana  Date:3/20/2020  : 1960  [x]  Patient  Verified  Payor: BLUE CROSS MEDICAID / Plan: [facility] HEALTHKEEPERS PLUS / Product Type: Managed Care Medicaid /    In time:1:31  Out time:2:50  Total Treatment Time (min): 78  Visit #: 6 of 16    Medicare/BCBS Only   Total Timed Codes (min):  75 1:1 Treatment Time:  75       Treatment Area: Strain of muscle(s) and tendon(s) of the rotator cuff of right shoulder, subsequent encounter [S46.011D]    SUBJECTIVE  Pain Level (0-10 scale): 0  Any medication changes, allergies to medications, adverse drug reactions, diagnosis change, or new procedure performed?: [x] No    [] Yes (see summary sheet for update)  Subjective functional status/changes:   [] No changes reported  Patient continues to note that she is unable to raise her arm raise her arm above shoulder height. OBJECTIVE      40 min Therapeutic Exercise:  [] See flow sheet :   Rationale: increase ROM and increase strength to improve the patients ability to increase her functional activity level. 23 min Neuromuscular Re-education:  []  See flow sheet :   Rationale: increase strength and improve coordination  to improve the patients ability to increase patients ADLs. 12 min Manual Therapy:  GH joint mobs in supine, grade I, II, III. Manual stretching in all planes. Rationale: increase ROM and increase tissue extensibility to increase ease of motion to improve function. With   [] TE   [] TA   [] neuro   [] other: Patient Education: [x] Review HEP    [] Progressed/Changed HEP based on:   [] positioning   [] body mechanics   [] transfers   [] heat/ice application    [] other:      Other Objective/Functional Measures: Unable to elevate the right arm without UT substitution pattern.   Unable to smoothly eccentrically lower her right arm      Pain Level (0-10 scale) post treatment: 0    ASSESSMENT/Changes in Function: Patient continues with decreased pain but she is not able to actively elevate her right arm beyond 80 degrees. Patient will continue to benefit from skilled PT services to modify and progress therapeutic interventions, address functional mobility deficits, address ROM deficits, address strength deficits and analyze and address soft tissue restrictions to attain remaining goals. [x]  See Plan of Care  []  See progress note/recertification  []  See Discharge Summary         Progress towards goals / Updated goals:  Short Term Goals: To be accomplished in 3 weeks:  1.  Patient will become proficient in their HEP and will be compliant in performing that program.  PN:  Goal met: Pt reports performing HEP. 2.  Patient will demonstrate PROM right shoulder flex 0-90, scaption 0-90, IR 0-20, ER 0-20 to increase ease of ADLs. PN:  Goal met Flexion 125 deg, Scaption 115 deg, ER (45 deg abd) 35 deg,         Long Term Goals: To be accomplished in 6 weeks:  1. Patient's pain level will be 2-5/10 with activity in order to improve patient's ability to perform normal ADLs. PN:  pain range 0-4/10. 2/28/20  Current: Progressing: pain range 0-1/10. 3/20/20  2. Patient will demonstrate AROM right shoulder flex 0-120, scaption 0-90, IR 0-30, ER 0-40 to increase ease of ADLs.    PN: AROM right shoulder flexion 0-80 deg, scaption 0-75 deg, functional ER top of head, functional IR = T10   Current: Progressing: AROM right shoulder flexion 0-80 deg, scaption 0-60 deg, functional ER to back of her neck/occiput, functional IR = T10, 3/16/2020  3. Patient will increase FOTO score to 63 to indicate increased functional mobility. PN:  FOTO = 49.   4. Patient will be able to reach to the bottom shelf of a kitchen cabinet in order to perform normal ADLs.   PN:Goal met:   Pt is able to reach the second shelf in therapy cabinet.  3/16/20.     PLAN  [x]  Upgrade activities as tolerated     [x]  Continue plan of care  []  Update interventions per flow sheet       []  Discharge due to:_  []  Other:_      Yusuf Briggs, PT 3/20/2020  1:40 PM    Future Appointments   Date Time Provider Robert Guevara   3/23/2020  1:30 PM Sadia Horne [facility]   4/29/2020  1:00 PM JOSHUA Nair [facility] [address]

## 2020-03-23 ENCOUNTER — HOSPITAL ENCOUNTER (OUTPATIENT)
Dept: PHYSICAL THERAPY | Age: 60
Discharge: HOME | End: 2020-03-23
Payer: MEDICAID

## 2020-03-23 PROCEDURE — 97112 NEUROMUSCULAR REEDUCATION: CPT

## 2020-03-23 PROCEDURE — 97110 THERAPEUTIC EXERCISES: CPT

## 2020-03-23 PROCEDURE — 97140 MANUAL THERAPY 1/> REGIONS: CPT

## 2020-03-23 NOTE — PROGRESS NOTES
PT DAILY TREATMENT NOTE 10-18    Patient Name: Patrice Cid  Date:3/23/2020  : 1960  [x]  Patient  Verified  Payor: BLUE CROSS MEDICAID / Plan: [facility] HEALTHKEEPERS PLUS / Product Type: Managed Care Medicaid /    In time:1:33  Out time:2:33  Total Treatment Time (min): 60  Visit #: 7 of 16    Medicare/BCBS Only   Total Timed Codes (min):  60 1:1 Treatment Time:  40       Treatment Area: Strain of muscle(s) and tendon(s) of the rotator cuff of right shoulder, subsequent encounter [S46.011D]    SUBJECTIVE  Pain Level (0-10 scale): 0  Any medication changes, allergies to medications, adverse drug reactions, diagnosis change, or new procedure performed?: [x] No    [] Yes (see summary sheet for update)  Subjective functional status/changes:   [] No changes reported  Pt reports she feels her arm is doing better.      OBJECTIVE        Min Type Additional Details    [] Estim:  []Unatt       []IFC  []Premod                        []Other:  []w/ice   []w/heat  Position:  Location:    [] Estim: []Att    []TENS instruct  []NMES                    []Other:  []w/US   []w/ice   []w/heat  Position:  Location:    []  Traction: [] Cervical       []Lumbar                       [] Prone          []Supine                       []Intermittent   []Continuous Lbs:  [] before manual  [] after manual    []  Ultrasound: []Continuous   [] Pulsed                           []1MHz   []3MHz W/cm2:  Location:    []  Iontophoresis with dexamethasone         Location: [] Take home patch   [] In clinic    []  Ice     []  heat  []  Ice massage  []  Laser   []  Anodyne Position:  Location:    []  Laser with stim  []  Other:  Position:  Location:    []  Vasopneumatic Device Pressure:       [] lo [] med [] hi   Temperature: [] lo [] med [] hi   [] Skin assessment post-treatment:  []intact []redness- no adverse reaction    []redness - adverse reaction:         35 min Therapeutic Exercise:  [x]??????? See flow sheet : Rationale: increase ROM and increase strength to improve the patients ability to increase tolerance to activities.          15 min Neuromuscular Re-education:  [x]? ????  See flow sheet :scpaular stabilization exercises.    Rationale: increase ROM and increase strength  to improve the patients ability to increase ease with overhead activities.          10 min Manual Therapy:  Sidelying- STM/TPR UT/LS, STJ mobs grade  1-2, supine- PROM in all planes pain free range. Rationale: decrease pain, increase ROM, increase tissue extensibility and decrease trigger points to increase ease with ADLs.                With   [] TE   [] TA   [] neuro   [] other: Patient Education: [x] Review HEP    [] Progressed/Changed HEP based on:   [] positioning   [] body mechanics   [] transfers   [] heat/ice application    [] other:      Other Objective/Functional Measures: cues for form with AROM shoulder ER. Pain Level (0-10 scale) post treatment: 0    ASSESSMENT/Changes in Function: Pt has poor scapulohumeral rhythm with shoulder elevation PROM and AROM. Patient will continue to benefit from skilled PT services to modify and progress therapeutic interventions, address functional mobility deficits, address ROM deficits, address strength deficits and analyze and address soft tissue restrictions to attain remaining goals. []  See Plan of Care  []  See progress note/recertification  []  See Discharge Summary         Progress towards goals / Updated goals:  Short Term Goals: To be accomplished in 3 weeks:  1.  Patient will become proficient in their HEP and will be compliant in performing that program.  PN:  Goal met: Pt reports performing HEP. 2.  Patient will demonstrate PROM right shoulder flex 0-90, scaption 0-90, IR 0-20, ER 0-20 to increase ease of ADLs. PN:  Goal met Flexion 125 deg, Scaption 115 deg, ER (45 deg abd) 35 deg,         Long Term Goals: To be accomplished in 6 weeks:  1.  Patient's pain level will be 2-5/10 with activity in order to improve patient's ability to perform normal ADLs. PN:  pain range 0-4/10. 2/28/20  Current: Goal met: pain range 0/10. 3/23/20  2. Patient will demonstrate AROM right shoulder flex 0-120, scaption 0-90, IR 0-30, ER 0-40 to increase ease of ADLs.    PN: AROM right shoulder flexion 0-80 deg, scaption 0-75 deg, functional ER top of head, functional IR = T10   Current: Progressing: AROM right shoulder flexion 0-80 deg, scaption 0-60 deg, functional ER to back of her neck/occiput, functional IR = T10, 3/16/2020  3. Patient will increase FOTO score to 63 to indicate increased functional mobility. PN:  FOTO = 49.   4. Patient will be able to reach to the bottom shelf of a kitchen cabinet in order to perform normal ADLs.   PN:Goal met:   Pt is able to reach the second shelf in therapy cabinet.  3/16/20.        PLAN  []  Upgrade activities as tolerated     [x]  Continue plan of care  []  Update interventions per flow sheet       []  Discharge due to:_  []  Other:_      Geo Gamino PTA 3/23/2020  1:45 PM    Future Appointments   Date Time Provider Robert Guevara   3/25/2020  2:30 PM Sadia Dao [facility]   4/1/2020  1:30 PM Silvia Guidry, PT [facility]   4/3/2020  1:30 PM Sil[facility] Guidry PT [facility]   4/8/2020  1:30 PM Silvia Guidry PT [facility]   4/10/2020  1:30 PM Lalo Robertson PTA [facility]   4/15/2020  1:30 PM Lalo Robertson PTA [facility]   4/17/2020  1:30 PM [facility]   4/22/2020  1:30 PM Lalo Robertson, PTA [facility]   4/24/2020  1:30 PM Gregg, [address]  [facility]   4/29/2020  1:00 PM JOSHUA Saha [address]

## 2020-03-25 ENCOUNTER — HOSPITAL ENCOUNTER (OUTPATIENT)
Dept: PHYSICAL THERAPY | Age: 60
End: 2020-03-25
Payer: MEDICAID

## 2020-03-27 ENCOUNTER — HOSPITAL ENCOUNTER (OUTPATIENT)
Dept: PHYSICAL THERAPY | Age: 60
Discharge: HOME | End: 2020-03-27
Payer: MEDICAID

## 2020-03-27 PROCEDURE — 97140 MANUAL THERAPY 1/> REGIONS: CPT | Performed by: PHYSICAL THERAPIST

## 2020-03-27 PROCEDURE — 97110 THERAPEUTIC EXERCISES: CPT | Performed by: PHYSICAL THERAPIST

## 2020-03-27 PROCEDURE — 97112 NEUROMUSCULAR REEDUCATION: CPT | Performed by: PHYSICAL THERAPIST

## 2020-03-27 NOTE — PROGRESS NOTES
PT DAILY TREATMENT NOTE 10-18    Patient Name: Ruby Walters  Date:3/27/2020  : 1960  [x]  Patient  Verified  Payor: BLUE CROSS MEDICAID / Plan: [facility] HEALTHKEEPERS PLUS / Product Type: Managed Care Medicaid /    In time:8:55  Out time:10:03  Total Treatment Time (min): 68  Visit #: 8 of 16    Medicare/BCBS Only   Total Timed Codes (min):  68 1:1 Treatment Time:  60       Treatment Area: Strain of muscle(s) and tendon(s) of the rotator cuff of right shoulder, subsequent encounter [S46.011D]    SUBJECTIVE  Pain Level (0-10 scale): 0  Any medication changes, allergies to medications, adverse drug reactions, diagnosis change, or new procedure performed?: [x] No    [] Yes (see summary sheet for update)  Subjective functional status/changes:   [] No changes reported  Patient with CC of stiffness in her right shoulder. OBJECTIVE    33 min Therapeutic Exercise:  [] See flow sheet:  Emphasis on increased AROM right shoulder and increased right UE strength. Rationale: increase ROM and increase strength to improve the patients ability to increase her functional activity level. 20 min Neuromuscular Re-education:  []  See flow sheet:  Emphasis on recruitment and activation of scapular muscles for increased proximal stability. Rationale: increase strength and improve coordination  to improve the patients ability to increase patients ADLs and overhead activities. 15 min Manual Therapy:  Right GH mobs in supine, grade II, III; Scapular mob in side lying to increase scapular mobility with AROM. Rationale: increase ROM and increase tissue extensibility to increase ease of motion to improve function.             With   [] TE   [] TA   [] neuro   [] other: Patient Education: [x] Review HEP    [] Progressed/Changed HEP based on:   [] positioning   [] body mechanics   [] transfers   [] heat/ice application    [] other:      Other Objective/Functional Measures: Unable to eccentrically lower the right shoulder with control. AROM right shoulder flexion 0-60; scaption 0-62 degrees. Pain Level (0-10 scale) post treatment: 0    ASSESSMENT/Changes in Function: Patient with loss of active flexion and decreased functional reaching. Patient will continue to benefit from skilled PT services to modify and progress therapeutic interventions, address functional mobility deficits, address ROM deficits, address strength deficits and analyze and address soft tissue restrictions to attain remaining goals. [x]  See Plan of Care  []  See progress note/recertification  []  See Discharge Summary         Progress towards goals / Updated goals:  Short Term Goals: To be accomplished in 3 weeks:  1.  Patient will become proficient in their HEP and will be compliant in performing that program.  PN:  Goal met: Pt reports performing HEP. 3/27/20  2.  Patient will demonstrate PROM right shoulder flex 0-90, scaption 0-90, IR 0-20, ER 0-20 to increase ease of ADLs. PN:  Goal met Flexion 125 deg, Scaption 115 deg, ER (45 deg abd) 35 deg,         Long Term Goals: To be accomplished in 6 weeks:  1. Patient's pain level will be 2-5/10 with activity in order to improve patient's ability to perform normal ADLs. PN:  pain range 0-4/10. 2/28/20  Current: Goal met: pain range 0/10. 3/23/20  2. Patient will demonstrate AROM right shoulder flex 0-120, scaption 0-90, IR 0-30, ER 0-40 to increase ease of ADLs.    PN: AROM right shoulder flexion 0-80 deg, scaption 0-75 deg, functional ER top of head, functional IR = T10   Current:  AROM right shoulder flexion 0-60 deg, scaption 0-62 deg, functional ER to back of her neck/occiput, functional IR = T10, 3/27/2020. Regressing, loss of 20 flexion. 3. Patient will increase FOTO score to 63 to indicate increased functional mobility. PN:  FOTO = 49.    Current:  FOTO = 49, no change since 2/25/20.  4. Patient will be able to reach to the bottom shelf of a kitchen cabinet in order to perform normal ADLs.  PN:Goal met:   Pt is able to reach the second shelf in therapy cabinet.  3/16/20.     PLAN  [x]  Upgrade activities as tolerated     [x]  Continue plan of care  []  Update interventions per flow sheet       []  Discharge due to:_  []  Other:_      Meir Miranda, PT 3/27/2020  8:57 AM    Future Appointments   Date Time Provider Robert Paola   3/27/2020  9:00 AM Marc Vega, PT [facility]   4/1/2020  1:30 PM Cathy Nnamdi, PT [facility]   4/3/2020  1:30 PM Cathy Nnamdi, PT [facility]   4/8/2020  1:30 PM [place], [address] [facility]   4/10/2020  1:30 PM Fayrene Lanes, PTA [facility]   4/15/2020  1:30 PM Fayrene Lanes, PTA [facility]   4/17/2020  1:30 PM Escobedo, [address] [facility]   4/22/2020  1:30 PM Fayrene Lanes, PTA [facility]   4/24/2020  1:30 PM [place]obedo, [address] [facility]   4/29/2020  1:00 PM JOSHUA Horowitz [facility] [address]

## 2020-03-27 NOTE — PROGRESS NOTES
[facility]              [address]        [place], [address]   [phone] fax    Physician Update  [x] Progress Note  [] Discharge Summary  Patient name: Abbey Eisenmenger Start of Care: 20   Referral source: Facundo Lam : 1960   Medical/Treatment Diagnosis: Strain of muscle(s) and tendon(s) of the rotator cuff of right shoulder, subsequent encounter [S46.011D]  Payor: BLUE CROSS MEDICAID / Plan: [address] [place] [address] / Product Type: Managed Care Medicaid /  Onset Date:12/3/19     Prior Hospitalization: see medical history Provider#: [id number]   Medications: Verified on Patient Summary List    Comorbidities: Back Pain, Depression, HBP, Headaches, Sleep Dysfunction. Prior Level of Function: Independent. 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 Visits from Start of Care: 15    Missed Visits: 6    Status at Evaluation/Last Progress Note:   Short Term Goals: To be accomplished in 3 weeks:  1.  Patient will become proficient in their HEP and will be compliant in performing that program.  Evaluation:   Patient given a written/illustrated HEP. Current; Goal met: Pt reports performing HEP. 2.  Patient will demonstrate PROM right shoulder flex 0-90, scaption 0-90, IR 0-20, ER 0-20 to increase ease of ADLs. Evaluation:  PROM flex 0-80; scaption 0-70; ER (@ 0 degrees abd) 0. Current: Progressing, Flexion 125 deg, Scaption 115 deg, ER (45 deg abd) 35 deg,         Long Term Goals: To be accomplished in 6 weeks:  1. Patient's pain level will be 2-5/10 with activity in order to improve patient's ability to perform normal ADLs. Evaluation:  2/10-9/10. Current: Progressing: pain range 1-8/10.  2. Patient will demonstrate AROM right shoulder flex 0-120, scaption 0-90, IR 0-30, ER 0-40 to increase ease of ADLs.   Evaluation:  AROM Left shoulder flex 0-165; Scaption 0-135; IR 0-70; ER 0-70  Current: Progressing: AROM right shoulder flexion 0-80 deg, scaption 0-75 deg, functional ER top of head, functional IR = T10      3. Patient will increase FOTO score to 63 to indicate increased functional mobility. Evaluation:  35  Current: Progressing: FOTO = 49.   4. Patient will be able to reach to the bottom shelf of a kitchen cabinet in order to perform normal ADLs. Evaluation:  Unable to assess at this time, unable to actively use her right arm per protocol. Current: Progressing: Pt is able to reach half way to bottom shelf. Progress towards Goals:   Short Term Goals: To be accomplished in 3 weeks:  1.  Patient will become proficient in their HEP and will be compliant in performing that program.  PN:  Goal met: Pt reports performing HEP. 3/27/20  2.  Patient will demonstrate PROM right shoulder flex 0-90, scaption 0-90, IR 0-20, ER 0-20 to increase ease of ADLs. PN:  Goal met Flexion 125 deg, Scaption 115 deg, ER (45 deg abd) 35 deg,         Long Term Goals: To be accomplished in 6 weeks:  1. Patient's pain level will be 2-5/10 with activity in order to improve patient's ability to perform normal ADLs. PN:  pain range 0-4/10. 2/28/20  Current: Goal met: pain range 0/10. 3/23/20  2. Patient will demonstrate AROM right shoulder flex 0-120, scaption 0-90, IR 0-30, ER 0-40 to increase ease of ADLs.    PN: AROM right shoulder flexion 0-80 deg, scaption 0-75 deg, functional ER top of head, functional IR = T10   Current:  AROM right shoulder flexion 0-60 deg, scaption 0-62 deg, functional ER to back of her neck/occiput, functional IR = T10, 3/27/2020. Regressing, loss of 20 flexion. 3. Patient will increase FOTO score to 63 to indicate increased functional mobility. PN:  FOTO = 49. Current:  FOTO = 49, no change since 2/25/20.  4. Patient will be able to reach to the bottom shelf of a kitchen cabinet in order to perform normal ADLs. PN:Goal met:   Pt is able to reach the second shelf in therapy cabinet.  3/16/20.     Goals: to be achieved in 4 weeks:  1. Patient will demonstrate AROM right shoulder flex 0-120, scaption 0-90, IR 0-30, ER 0-40 to increase ease of ADLs.    PN:  AROM right shoulder flexion 0-60 deg, scaption 0-62 deg, functional ER to back of her neck/occiput, functional IR = T10, 3/27/2020. Regressing, loss of 20 flexion. 2. Patient will increase FOTO score to 63 to indicate increased functional mobility. PN:  FOTO = 49, no change since 2/25/20. ASSESSMENT/RECOMMENDATIONS: Patient has a loss of active motion and (+) drop arm noted with some of her activities. She notes difficulty with trying to reach overhead. She is able to reach the bottom shelf of a cabinet in therapy and previously was able to reach the second shelf. [x]Continue therapy per initial plan/protocol at a frequency of  2 x per week for 4 weeks. []Continue therapy with the following recommended changes:_____________________      _____________________________________________________________________  []Discontinue therapy progressing towards or have reached established goals  []Discontinue therapy due to lack of appreciable progress towards goals  []Discontinue therapy due to lack of attendance or compliance  []Await Physician's recommendations/decisions regarding therapy  []Other:________________________________________________________________    Thank you for this referral.    Sara Mendoza, PT 3/27/2020 10:59 AM    NOTE TO PHYSICIAN:  PLEASE COMPLETE THE ORDERS BELOW AND   FAX TO [facility]: [phone]  If you are unable to process this request in 24 hours please contact our office: [phone]    []  I have read the above report and request that my patient continue as recommended.   []  I have read the above report and request that my patient continue therapy with the following changes/special instructions:________________________________________  []I have read the above report and request that my patient be discharged from therapy.     [de-identified] Signature:____________Date:_________TIME:________    [facility], Date and Time must be completed for valid certification **

## 2020-04-01 ENCOUNTER — APPOINTMENT (OUTPATIENT)
Dept: PHYSICAL THERAPY | Age: 60
End: 2020-04-01
Payer: MEDICAID

## 2020-04-03 ENCOUNTER — APPOINTMENT (OUTPATIENT)
Dept: PHYSICAL THERAPY | Age: 60
End: 2020-04-03
Payer: MEDICAID

## 2020-04-08 ENCOUNTER — HOSPITAL ENCOUNTER (OUTPATIENT)
Dept: PHYSICAL THERAPY | Age: 60
Discharge: HOME | End: 2020-04-08
Payer: MEDICAID

## 2020-04-08 PROCEDURE — 97140 MANUAL THERAPY 1/> REGIONS: CPT

## 2020-04-08 PROCEDURE — 97110 THERAPEUTIC EXERCISES: CPT

## 2020-04-08 PROCEDURE — 97112 NEUROMUSCULAR REEDUCATION: CPT

## 2020-04-08 NOTE — PROGRESS NOTES
PT DAILY TREATMENT NOTE 10-18    Patient Name: Brittanie Stuart  Date:2020  : 1960  [x]  Patient  Verified  Payor: BLUE CROSS MEDICAID / Plan: [facility] [employer] / Product Type: Managed Care Medicaid /    In time: 4:30pm  Out time: 5:27pm  Total Treatment Time (min): 62  Visit #: 1 of 8    Medicare/BCBS Only   Total Timed Codes (min):  57 1:1 Treatment Time:  62       Treatment Area: Strain of muscle(s) and tendon(s) of the rotator cuff of right shoulder, subsequent encounter [S46.011D]    SUBJECTIVE  Pain Level (0-10 scale): 0  Any medication changes, allergies to medications, adverse drug reactions, diagnosis change, or new procedure performed?: [x] No    [] Yes (see summary sheet for update)  Subjective functional status/changes:   [] No changes reported  Patient returns to clinic from x2/week break from PT secondary to sickness. States performing HEP daily however is still not pleased with progress. No pain, however has difficulty lifting arm. OBJECTIVE    27 min Therapeutic Exercise:  [x]? See flow sheet:  Emphasis on increased AROM right shoulder and increased right UE strength. Rationale: increase ROM and increase strength to improve the patients ability to increase her functional activity level.     20 min Neuromuscular Re-education:  [x]? See flow sheet:  Emphasis on recruitment and activation of scapular muscles for increased proximal stability. Rationale: increase strength and improve coordination  to improve the patients ability to increase patients ADLs and overhead activities.     10 min Manual Therapy:  Right GH mobs in supine, grade II, III; Scapular mob in side lying to increase scapular mobility with AROM. Rationale: increase ROM and increase tissue extensibility to increase ease of motion to improve function.           With   [x] TE   [] TA   [] neuro   [] other: Patient Education: [x] Review HEP    [] Progressed/Changed HEP based on:   [] positioning   [] body mechanics   [] transfers   [] heat/ice application    [] other:      Other Objective/Functional Measures: See goals below      Pain Level (0-10 scale) post treatment: 0    ASSESSMENT/Changes in Function:     Improvements in AROM since last PN reporting period evident by objective measures noted below. Continued progression of activity program with emphasis placed on scapular control - pt tolerates well with no increase in symptoms, however fatigues quickly into activities and requires significant tactile/verbal cuing in order to minimize UT activation and optimize overall performance. Patient will continue to benefit from skilled PT services to modify and progress therapeutic interventions, address functional mobility deficits, address ROM deficits, address strength deficits, analyze and address soft tissue restrictions, analyze and cue movement patterns, analyze and modify body mechanics/ergonomics and assess and modify postural abnormalities to attain remaining goals. [x]  See Plan of Care  []  See progress note/recertification  []  See Discharge Summary         Progress towards goals / Updated goals:  Short Term Goals: To be accomplished in 3 weeks:  1.  Patient will become proficient in their HEP and will be compliant in performing that program.  PN:  Goal met: Pt reports performing HEP. 3/27/20     2.  Patient will demonstrate PROM right shoulder flex 0-90, scaption 0-90, IR 0-20, ER 0-20 to increase ease of ADLs. PN:  Goal met Flexion 125 deg, Scaption 115 deg, ER (45 deg abd) 35 deg,         Long Term Goals: To be accomplished in 6 weeks:  1. Patient's pain level will be 2-5/10 with activity in order to improve patient's ability to perform normal ADLs. PN:   Goal met: pain range 0/10. 3/23/20    2. Patient will demonstrate AROM right shoulder flex 0-120, scaption 0-90, IR 0-30, ER 0-40 to increase ease of ADLs.    PN:  AROM right shoulder flexion 0-60 deg, scaption 0-62 deg, functional ER to back of her neck/occiput, functional IR = T10, 3/27/2020.  Regressing, loss of 20 flexion. Current: AROM right shoulder flexion 0-75 deg, scaption 0-70 deg, functional ER to back of her neck/occiput, functional IR = T10, 04/08/20    3. Patient will increase FOTO score to 63 to indicate increased functional mobility. PN:  FOTO = 49, no change since 2/25/20.    4. Patient will be able to reach to the bottom shelf of a kitchen cabinet in order to perform normal ADLs.   PN:Goal met:   Pt is able to reach the second shelf in therapy cabinet.  3/16/20.       PLAN  [x]  Upgrade activities as tolerated     [x]  Continue plan of care  [x]  Update interventions per flow sheet       []  Discharge due to:_  []  Other:_      Rae Boucher DPT 4/8/2020  4:25 PM    Future Appointments   Date Time Provider Robert Minayaisti   4/8/2020  4:30 PM Zafar Caba DPT [facility]   4/10/2020 11:30 AM Zafar Caba DPT [facility]   4/15/2020  1:30 PM Ashish Hussein PTA [facility]   4/17/2020  1:30 PM Sylvie Sanchez, PT [facility]   4/22/2020  1:30 PM Ashish Hussein PTA [facility]   4/24/2020  1:30 PM Sylvie Sanchez, PT [facility]   6/3/2020 10:30 AM JOSHUA Santacruz MD [address]

## 2020-04-10 ENCOUNTER — HOSPITAL ENCOUNTER (OUTPATIENT)
Dept: PHYSICAL THERAPY | Age: 60
Discharge: HOME | End: 2020-04-10
Payer: MEDICAID

## 2020-04-10 PROCEDURE — 97110 THERAPEUTIC EXERCISES: CPT

## 2020-04-10 PROCEDURE — 97112 NEUROMUSCULAR REEDUCATION: CPT

## 2020-04-10 PROCEDURE — 97140 MANUAL THERAPY 1/> REGIONS: CPT

## 2020-04-10 NOTE — PROGRESS NOTES
PT DAILY TREATMENT NOTE 10-18    Patient Name: Dominique Perez  Date:4/10/2020  : 1960  [x]  Patient  Verified  Payor: BLUE CROSS MEDICAID / Plan: [facility] HEALTHKEEPERS PLUS / Product Type: Managed Care Medicaid /    In time:1:35pm Out time: 2:28pm  Total Treatment Time (min): 48  Visit #: 2 of 8    Medicare/BCBS Only   Total Timed Codes (min):  53 1:1 Treatment Time:  53       Treatment Area: Strain of muscle(s) and tendon(s) of the rotator cuff of right shoulder, subsequent encounter [S46.011D]    SUBJECTIVE  Pain Level (0-10 scale): 0  Any medication changes, allergies to medications, adverse drug reactions, diagnosis change, or new procedure performed?: [x] No    [] Yes (see summary sheet for update)  Subjective functional status/changes:   [] No changes reported  Pt reports some increased muscular soreness following previous session, otherwise no new concerns. OBJECTIVE    25 min Therapeutic Exercise:  [x]? ? See flow sheet:  Emphasis on increased AROM right shoulder and increased right UE strength. Rationale: increase ROM and increase strength to improve the patients ability to increase her functional activity level.     18 min Neuromuscular Re-education:  [x]? ?  See flow sheet:  Emphasis on recruitment and activation of scapular muscles for increased proximal stability. Rationale: increase strength and improve coordination  to improve the patients ability to increase patients ADLs and overhead activities.     10 min Manual Therapy:  Right GH mobs in supine, grade II, III; Scapular mob in side lying to increase scapular mobility with AROM. Rationale: increase ROM and increase tissue extensibility to increase ease of motion to improve function.             With   [x] TE   [] TA   [] neuro   [] other: Patient Education: [x] Review HEP    [] Progressed/Changed HEP based on:   [] positioning   [] body mechanics   [] transfers   [] heat/ice application    [] other:      Pain Level (0-10 scale) post treatment: 0    ASSESSMENT/Changes in Function:     Continued progression of activity program with emphasis placed on scapular control - pt tolerates well with no increase in symptoms, however fatigues quickly into activities and requires significant tactile/verbal cuing in order to minimize UT activation and optimize overall performance. Patient will continue to benefit from skilled PT services to modify and progress therapeutic interventions, address functional mobility deficits, address ROM deficits, address strength deficits, analyze and address soft tissue restrictions, analyze and cue movement patterns, analyze and modify body mechanics/ergonomics and assess and modify postural abnormalities to attain remaining goals. [x]  See Plan of Care  []  See progress note/recertification  []  See Discharge Summary         Progress towards goals / Updated goals:  Short Term Goals: To be accomplished in 3 weeks:  1.  Patient will become proficient in their HEP and will be compliant in performing that program.  PN:  Goal met: Pt reports performing HEP. 3/27/20      2.  Patient will demonstrate PROM right shoulder flex 0-90, scaption 0-90, IR 0-20, ER 0-20 to increase ease of ADLs. PN:  Goal met Flexion 125 deg, Scaption 115 deg, ER (45 deg abd) 35 deg,         Long Term Goals: To be accomplished in 6 weeks:  1. Patient's pain level will be 2-5/10 with activity in order to improve patient's ability to perform normal ADLs. PN:   Goal met: pain range 0/10. 3/23/20     2. Patient will demonstrate AROM right shoulder flex 0-120, scaption 0-90, IR 0-30, ER 0-40 to increase ease of ADLs.    PN:  AROM right shoulder flexion 0-60 deg, scaption 0-62 deg, functional ER to back of her neck/occiput, functional IR = T10, 3/27/2020.  Regressing, loss of 20 flexion. Current: AROM right shoulder flexion 0-75 deg, scaption 0-70 deg, functional ER to back of her neck/occiput, functional IR = T10, 04/08/20     3.  Patient will increase FOTO score to 63 to indicate increased functional mobility. PN:  FOTO = 49, no change since 2/25/20.     4. Patient will be able to reach to the bottom shelf of a kitchen cabinet in order to perform normal ADLs.   PN:Goal met:   Pt is able to reach the second shelf in therapy cabinet.  3/16/20.     PLAN  [x]  Upgrade activities as tolerated     [x]  Continue plan of care  [x]  Update interventions per flow sheet       []  Discharge due to:_  []  Other:_      Rae Boucher DPT 4/10/2020  9:34 AM    Future Appointments   Date Time Provider Robert Guevara   4/10/2020 11:30 AM Zafar Caba DPT [facility]   4/15/2020  1:30 PM Ashish Hussein, PTA [facility]   4/17/2020  1:30 PM Tatiana Carter, PT [facility]   4/22/2020  1:30 PM Ashish Hussein, PTA [facility]   4/24/2020  1:30 PM Sylvie Sanchez PT [facility]   6/3/2020 10:30 AM JOSHUA Santacruz [address]

## 2020-04-15 ENCOUNTER — APPOINTMENT (OUTPATIENT)
Dept: PHYSICAL THERAPY | Age: 60
End: 2020-04-15
Payer: MEDICAID

## 2020-04-17 ENCOUNTER — APPOINTMENT (OUTPATIENT)
Dept: PHYSICAL THERAPY | Age: 60
End: 2020-04-17
Payer: MEDICAID

## 2020-04-24 ENCOUNTER — APPOINTMENT (OUTPATIENT)
Dept: PHYSICAL THERAPY | Age: 60
End: 2020-04-24
Payer: MEDICAID

## 2020-04-27 ENCOUNTER — APPOINTMENT (OUTPATIENT)
Dept: PHYSICAL THERAPY | Age: 60
End: 2020-04-27
Payer: MEDICAID

## 2020-04-28 ENCOUNTER — TELEPHONE (OUTPATIENT)
Dept: ORTHOPEDIC SURGERY | Age: 60
End: 2020-04-28

## 2020-04-28 DIAGNOSIS — S46.011D TRAUMATIC COMPLETE TEAR OF RIGHT ROTATOR CUFF, SUBSEQUENT ENCOUNTER: Primary | ICD-10-CM

## 2020-04-28 DIAGNOSIS — Z98.890 STATUS POST ARTHROSCOPY OF RIGHT SHOULDER: ICD-10-CM

## 2020-04-28 NOTE — TELEPHONE ENCOUNTER
Patient is requesting either an extension or a new order for p/t. She missed 2 sessions due to a death in the family and the virus and feels that she needs to continue therapy.   Please extend or re-new per patient request - she goes to [facility].

## 2020-04-30 ENCOUNTER — HOSPITAL ENCOUNTER (OUTPATIENT)
Dept: PHYSICAL THERAPY | Age: 60
Discharge: HOME | End: 2020-04-30
Payer: MEDICAID

## 2020-04-30 PROCEDURE — 97110 THERAPEUTIC EXERCISES: CPT

## 2020-04-30 PROCEDURE — 97112 NEUROMUSCULAR REEDUCATION: CPT

## 2020-04-30 NOTE — PROGRESS NOTES
PT DAILY TREATMENT NOTE 10-18    Patient Name: Jeremi Billing  Date:2020  : 1960  [x]  Patient  Verified  Payor: BLUE CROSS MEDICAID / Plan: [facility] HEALTHKEEPERS PLUS / Product Type: Managed Care Medicaid /    In time: 6:00pm  Out time: 6:55pm  Total Treatment Time (min): 55  Visit #: 1 of 8    Medicare/BCBS Only   Total Timed Codes (min):  55 1:1 Treatment Time:  55       Treatment Area: Strain of muscle(s) and tendon(s) of the rotator cuff of right shoulder, subsequent encounter [S46.011D]    SUBJECTIVE  Pain Level (0-10 scale): 0  Any medication changes, allergies to medications, adverse drug reactions, diagnosis change, or new procedure performed?: [x] No    [] Yes (see summary sheet for update)  Subjective functional status/changes:   [] No changes reported  Patient reports a 60% improvement in current condition since beginning skilled PT services. OBJECTIVE    30 min Therapeutic Exercise:  [x]? ?? See flow sheet:  Emphasis on increased AROM right shoulder and increased right UE strength. Rationale: increase ROM and increase strength to improve the patients ability to increase her functional activity level.     25 min Neuromuscular Re-education:  [x]? ??  See flow sheet:  Emphasis on recruitment and activation of scapular muscles for increased proximal stability. Rationale: increase strength and improve coordination  to improve the patients ability to increase patients ADLs and overhead activities. With   [x] TE   [] TA   [] neuro   [] other: Patient Education: [x] Review HEP    [] Progressed/Changed HEP based on:   [] positioning   [] body mechanics   [] transfers   [] heat/ice application    [] other:      Other Objective/Functional Measures: See goals below      Pain Level (0-10 scale) post treatment: 0    ASSESSMENT/Changes in Function:     Patient reports a 60% improvement in current condition since beginning skilled PT services.  Notable improvements in flexion AROM in comparison to previous reporting period, however continues to lack functional scaption AROM and strength. Limiting factors in progress include patient compliance - frequently cancels and/or no shows appointment times. Discussed importance of regular attendance in order to expedite recovery and achieve LTGs - pt verbally demonstrates knowledge and understanding of this. Patient will continue to benefit from skilled PT services to modify and progress therapeutic interventions, address functional mobility deficits, address ROM deficits, address strength deficits, analyze and address soft tissue restrictions, analyze and cue movement patterns, analyze and modify body mechanics/ergonomics and assess and modify postural abnormalities to attain remaining goals. []  See Plan of Care  [x]  See progress note/recertification  []  See Discharge Summary         Progress towards goals / Updated goals:  Short Term Goals: To be accomplished in 3 weeks:  1.  Patient will become proficient in their HEP and will be compliant in performing that program.  PN:  Goal met: Pt reports performing HEP. 3/27/20      2.  Patient will demonstrate PROM right shoulder flex 0-90, scaption 0-90, IR 0-20, ER 0-20 to increase ease of ADLs. PN:  Goal met Flexion 125 deg, Scaption 115 deg, ER (45 deg abd) 35 deg,       Long Term Goals: To be accomplished in 6 weeks:  1. Patient's pain level will be 2-5/10 with activity in order to improve patient's ability to perform normal ADLs. PN:   Goal met: pain range 0/10. 3/23/20     2. Patient will demonstrate AROM right shoulder flex 0-120, scaption 0-90, IR 0-30, ER 0-40 to increase ease of ADLs.    PN:  AROM right shoulder flexion 0-60 deg, scaption 0-62 deg, functional ER to back of her neck/occiput, functional IR = T10, 3/27/2020.  Regressing, loss of 20 flexion.   Current: Progressing: AROM right shoulder flexion 0-131 deg, scaption 0-85 deg, functional ER to back of her neck/occiput, functional IR = T10, 04/30/20     3. Patient will increase FOTO score to 63 to indicate increased functional mobility. PN:  FOTO = 49, no change since 2/25/20. Current: Goal Met, 66, 04/30/20     4. Patient will be able to reach to the bottom shelf of a kitchen cabinet in order to perform normal ADLs. PN:Goal met:   Pt is able to reach the second shelf in therapy cabinet.  3/16/20.      5.   Patient will demonstrate 5/5 right shoulder MMT in flexion and scaption in order to perform ADLs with greater ease  Current:  Flex: 3+/5  Scap: 3/5, 04/30/20    PLAN  [x]  Upgrade activities as tolerated     [x]  Continue plan of care  [x]  Update interventions per flow sheet       []  Discharge due to:_  []  Other:_      Alfreda Patton DPT 4/30/2020  3:37 PM    Future Appointments   Date Time Provider [facility]   4/30/2020  6:00 PM Jeannette Koyanagi, DPT [facility]   6/3/2020 10:30 AM JOSHUA Gonzales

## 2020-04-30 NOTE — PROGRESS NOTES
[facility] - [facility]              [address] [facility] [place], [address]   [phone] fax    Progress Note  Patient name: Charlotte Heath Start of Care: 20   Referral source: Rosemary Rangel, [place] : 1960   Medical/Treatment Diagnosis: Strain of muscle(s) and tendon(s) of the rotator cuff of right shoulder, subsequent encounter [S46.011D]  Payor: BLUE CROSS MEDICAID / Plan: [id number] [place] [address] / Product Type: Managed Care Medicaid /  Onset Date:12/3/19     Prior Hospitalization: see medical history Provider#: [id number]   Medications: Verified on Patient Summary List    Comorbidities: Back Pain, Depression, HBP, Headaches, Sleep Dysfunction. Prior Level of Function: Independent. 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 Visits from Start of Care: 17    Missed Visits: 11    Key Functional Changes:     Patient reports a 60% improvement in current condition since beginning skilled PT services. Notable improvements in flexion AROM in comparison to previous reporting period, however continues to lack functional scaption AROM and strength. Limiting factors in progress include patient compliance - frequently cancels and/or no shows appointment times, with only attending two sessions since previous reporting period. Discussed importance of regular attendance in order to expedite recovery and achieve LTGs - pt verbally demonstrates knowledge and understanding of this. Short Term Goals: To be accomplished in 3 weeks:  1.  Patient will become proficient in their HEP and will be compliant in performing that program.  PN:  Goal met: Pt reports performing HEP. 3/27/20      2.  Patient will demonstrate PROM right shoulder flex 0-90, scaption 0-90, IR 0-20, ER 0-20 to increase ease of ADLs. PN:  Goal met Flexion 125 deg, Scaption 115 deg, ER (45 deg abd) 35 deg,       Long Term Goals: To be accomplished in 6 weeks:  1.  Patient's pain level will be 2-5/10 with activity in order to improve patient's ability to perform normal ADLs. PN:   Goal met: pain range 0/10. 3/23/20     2. Patient will demonstrate AROM right shoulder flex 0-120, scaption 0-90, IR 0-30, ER 0-40 to increase ease of ADLs.    PN:  AROM right shoulder flexion 0-60 deg, scaption 0-62 deg, functional ER to back of her neck/occiput, functional IR = T10, 3/27/2020.  Regressing, loss of 20 flexion. Current: Progressing: AROM right shoulder flexion 0-131 deg, scaption 0-85 deg, functional ER to back of her neck/occiput, functional IR = T10, 04/30/20     3. Patient will increase FOTO score to 63 to indicate increased functional mobility. PN:  FOTO = 49, no change since 2/25/20. Current: Goal Met, 66, 04/30/20     4. Patient will be able to reach to the bottom shelf of a kitchen cabinet in order to perform normal ADLs. PN:Goal met:   Pt is able to reach the second shelf in therapy cabinet.  3/16/20.     Updated Goals: to be achieved in 4 weeks:   Continue with goals not met noted above in addition to:    5.   Patient will demonstrate 5/5 right shoulder MMT in flexion and scaption in order to perform ADLs with greater ease  Current:  Flex: 3+/5  Scap: 3/5    ASSESSMENT/RECOMMENDATIONS:  [x]Continue therapy per initial plan/protocol at a frequency of  2 x per week for 4 weeks  []Continue therapy with the following recommended changes:_____________________      _____________________________________________________________________  []Discontinue therapy progressing towards or have reached established goals  []Discontinue therapy due to lack of appreciable progress towards goals  []Discontinue therapy due to lack of attendance or compliance  []Await Physician's recommendations/decisions regarding therapy  []Other:________________________________________________________________    Thank you for this referral.    Lee Buck DPT 4/30/2020 2:04 PM  NOTE TO PHYSICIAN:  PLEASE COMPLETE THE ORDERS BELOW AND   FAX TO [facility]: [phone]  If you are unable to process this request in 24 hours please contact our office: [phone]    []  I have read the above report and request that my patient continue as recommended. []  I have read the above report and request that my patient continue therapy with the following changes/special instructions:________________________________________  []I have read the above report and request that my patient be discharged from therapy.     [de-identified] Signature:____________Date:_________TIME:________    [employer], Date and Time must be completed for valid certification **

## 2020-05-05 ENCOUNTER — TELEPHONE (OUTPATIENT)
Dept: ORTHOPEDIC SURGERY | Age: 60
End: 2020-05-05

## 2020-05-05 ENCOUNTER — TELEPHONE (OUTPATIENT)
Dept: PHYSICAL THERAPY | Age: 60
End: 2020-05-05

## 2020-05-05 NOTE — TELEPHONE ENCOUNTER
Rachel from [facility] needs provider signature on physical therapy order and last office notes as insurance doesn't recognize PA signature. Please update electronically and Jyoti Maciel will check for updates. Jyoti Maciel can be reached at [phone] if needed.

## 2020-05-12 ENCOUNTER — HOSPITAL ENCOUNTER (OUTPATIENT)
Dept: PHYSICAL THERAPY | Age: 60
Discharge: HOME | End: 2020-05-12
Payer: MEDICAID

## 2020-05-12 PROCEDURE — 97112 NEUROMUSCULAR REEDUCATION: CPT

## 2020-05-12 PROCEDURE — 97530 THERAPEUTIC ACTIVITIES: CPT

## 2020-05-12 PROCEDURE — 97110 THERAPEUTIC EXERCISES: CPT

## 2020-05-12 NOTE — PROGRESS NOTES
PT DAILY TREATMENT NOTE 10-18    Patient Name: Charlotte Heath  Date:2020  : 1960  [x]  Patient  Verified  Payor: BLUE CROSS MEDICAID / Plan: [address] [place] [address] / Product Type: Managed Care Medicaid /    In time:   Out time: 145pm  Total Treatment Time (min): 60  Visit #: 2 of 8    Medicare/BCBS Only   Total Timed Codes (min):  60 1:1 Treatment Time:  60       Treatment Area: Strain of muscle(s) and tendon(s) of the rotator cuff of right shoulder, subsequent encounter [S46.011D]    SUBJECTIVE  Pain Level (0-10 scale): 0  Any medication changes, allergies to medications, adverse drug reactions, diagnosis change, or new procedure performed?: [x] No    [] Yes (see summary sheet for update)  Subjective functional status/changes:   [] No changes reported  I haven't been here in a few weeks, it's amazing the changes i've noticed. Still some weakness and occasional pains, but it feels much better. OBJECTIVE    30 min Therapeutic Exercise:  [x]? ?? See flow sheet:  Emphasis on increased AROM right shoulder and increased right UE strength. Rationale: increase ROM and increase strength to improve the patients ability to increase functional activity level. With caring for grandchildren   20 min Neuromuscular Re-education:  [x]? ??  See flow sheet:  Emphasis on recruitment and activation of scapular muscles for increased proximal stability. Rationale: increase strength and improve coordination  to improve the patients ability to increase patients ADLs and overhead activities.   10 min Therapeutic Activity:  []  See flow sheet :   Rationale: improve coordination and increase proprioception  to improve the patients ability to perform household tasks with decreased scapular substitution            With   [x] TE   [] TA   [] neuro   [] other: Patient Education: [x] Review HEP    [] Progressed/Changed HEP based on:   [] positioning   [] body mechanics   [] transfers   [] heat/ice application [] other:      Other Objective/Functional Measures: cont per flow sheet with modified order to prevent fatigue    Pain Level (0-10 scale) post treatment: 0    ASSESSMENT/Changes in Function:     Pt with notable improvements in function and use of right UE during exercises and activities today. Reports things are easier at home for daily tasks and reaching into cabinets/shelves. Patient will continue to benefit from skilled PT services to modify and progress therapeutic interventions, address functional mobility deficits, address ROM deficits, address strength deficits, analyze and address soft tissue restrictions, analyze and cue movement patterns, analyze and modify body mechanics/ergonomics and assess and modify postural abnormalities to attain remaining goals. []  See Plan of Care  [x]  See progress note/recertification  []  See Discharge Summary         Progress towards goals / Updated goals:      Long Term Goals: To be accomplished in 6 weeks:  1. Patient's pain level will be 2-5/10 with activity in order to improve patient's ability to perform normal ADLs. PN:   Goal met: pain range 0/10. 3/23/20     2. Patient will demonstrate AROM right shoulder flex 0-120, scaption 0-90, IR 0-30, ER 0-40 to increase ease of ADLs.    PN:  AROM right shoulder flexion 0-60 deg, scaption 0-62 deg, functional ER to back of her neck/occiput, functional IR = T10, 3/27/2020.  Regressing, loss of 20 flexion. Current: Progressing: AROM right shoulder flexion 0-131 deg, scaption 0-85 deg, functional ER to back of her neck/occiput, functional IR = T10, 04/30/20     3. Patient will increase FOTO score to 63 to indicate increased functional mobility. PN:  FOTO = 49, no change since 2/25/20. Current: Goal Met, 66, 04/30/20     4. Patient will be able to reach to the bottom shelf of a kitchen cabinet in order to perform normal ADLs. PN:Goal met:   Pt is able to reach the second shelf in therapy cabinet.      5.   Patient will demonstrate 5/5 right shoulder MMT in flexion and scaption in order to perform ADLs with greater ease  Current:  Flex: 3+/5  Scap: 3+/5    PLAN  [x]  Upgrade activities as tolerated     [x]  Continue plan of care  []  Update interventions per flow sheet       []  Discharge due to:_  []  Other:_      Devika Sánchez PT 5/12/2020  3:37 PM    Future Appointments   Date Time Provider Robert Guevara   5/14/2020 11:45 AM Deandre Mullins PT [facility]   5/18/2020 12:15 PM Deandre Mullins PT [facility]   5/21/2020 12:30 PM Michele Hope [facility]   5/26/2020  3:45 PM Michele Hope [facility]   5/29/2020  1:15 PM Deandre Mullins PT [facility]   6/2/2020  3:45 PM Deandre Mullins PT [facility]   6/3/2020 10:30 AM JOSHUA Bajwa

## 2020-05-14 ENCOUNTER — APPOINTMENT (OUTPATIENT)
Dept: PHYSICAL THERAPY | Age: 60
End: 2020-05-14
Payer: MEDICAID

## 2020-05-18 ENCOUNTER — APPOINTMENT (OUTPATIENT)
Dept: PHYSICAL THERAPY | Age: 60
End: 2020-05-18
Payer: MEDICAID

## 2020-05-19 ENCOUNTER — APPOINTMENT (OUTPATIENT)
Dept: PHYSICAL THERAPY | Age: 60
End: 2020-05-19
Payer: MEDICAID

## 2020-05-19 ENCOUNTER — HOSPITAL ENCOUNTER (OUTPATIENT)
Dept: PHYSICAL THERAPY | Age: 60
Discharge: HOME | End: 2020-05-19
Payer: MEDICAID

## 2020-05-19 PROCEDURE — 97110 THERAPEUTIC EXERCISES: CPT

## 2020-05-19 PROCEDURE — 97112 NEUROMUSCULAR REEDUCATION: CPT

## 2020-05-19 PROCEDURE — 97530 THERAPEUTIC ACTIVITIES: CPT

## 2020-05-19 NOTE — PROGRESS NOTES
PT DAILY TREATMENT NOTE 10-18    Patient Name: Melanie Alonzo  Date:2020  : 1960  [x]  Patient  Verified  Payor: BLUE CROSS MEDICAID / Plan: [address] [place] [address] / Product Type: Managed Care Medicaid /    In time:331  Out time:426  Total Treatment Time (min): 55  Visit #: 3 of 8    Medicare/BCBS Only   Total Timed Codes (min):  55 1:1 Treatment Time:  44       Treatment Area: Strain of muscle(s) and tendon(s) of the rotator cuff of right shoulder, subsequent encounter [S46.011D]    SUBJECTIVE  Pain Level (0-10 scale): 1-2  Any medication changes, allergies to medications, adverse drug reactions, diagnosis change, or new procedure performed?: [x] No    [] Yes (see summary sheet for update)  Subjective functional status/changes:   [] No changes reported  Patient reports slight increase in soreness today which patient attributes to the rain. OBJECTIVE      20 min Therapeutic Exercise:  [x] See flow sheet : Emphasis placed on improving available shoulder AROM and UE strength   Rationale: increase ROM and increase strength to improve the patients ability to improve ease with overhead ADLs    10 min Therapeutic Activity:  [x]  See flow sheet : Emphasis placed on improving UE strength and ROM with elevation   Rationale: increase ROM and increase strength  to improve the patients ability to improve ease with overhead reaching.      25 min Neuromuscular Re-education:  [x]  See flow sheet : Emphasis placed on improving activation and recruitment of the glenohumeral and scapulothoracic musculature and improving scapulohumeral rhythm with UE elevation   Rationale: increase ROM, increase strength and increase proprioception  to improve the patients ability to improve ease with self-care ADLs    With   [] TE   [] TA   [] neuro   [] other: Patient Education: [x] Review HEP    [] Progressed/Changed HEP based on:   [] positioning   [] body mechanics   [] transfers   [] heat/ice application    [] other: Other Objective/Functional Measures:   Flexion MMT 3+/5, Scaption MMT 3+/5     Pain Level (0-10 scale) post treatment: 0    ASSESSMENT/Changes in Function: Continued prominent weakness objectively noted but with ability to further successfully progress shoulder strengthening with patient ability to self-correct Upper trapezius compensation. With repetition patient noted to demonstrate fatigue with increased verbal cuing required for form correction. Patient will continue to benefit from skilled PT services to modify and progress therapeutic interventions, address functional mobility deficits, address ROM deficits, address strength deficits, analyze and address soft tissue restrictions, analyze and cue movement patterns, analyze and modify body mechanics/ergonomics and assess and modify postural abnormalities to attain remaining goals. []  See Plan of Care  []  See progress note/recertification  []  See Discharge Summary         Progress towards goals / Updated goals:     Long Term Goals: To be accomplished in 6 weeks:  1. Patient's pain level will be 2-5/10 with activity in order to improve patient's ability to perform normal ADLs. PN: Met, Pain range 0/10. 3/23/20  2. Patient will demonstrate AROM right shoulder flex 0-120, scaption 0-90, IR 0-30, ER 0-40 to increase ease of ADLs.    PN:  AROM right shoulder flexion 0-60 deg, scaption 0-62 deg, functional ER to back of her neck/occiput, functional IR = T10, 3/27/2020.  Regressing, loss of 20 flexion. Current: Progressing: AROM right shoulder flexion 0-131 deg, scaption 0-85 deg, functional ER to back of her neck/occiput, functional IR = T10, 04/30/20  3. Patient will increase FOTO score to 63 to indicate increased functional mobility. PN:  FOTO = 49, no change since 2/25/20. Current: Goal Met, 66, 04/30/20  4. Patient will be able to reach to the bottom shelf of a kitchen cabinet in order to perform normal ADLs.   PN: Met,  Pt is able to reach the second shelf in therapy cabinet.    5.  Patient will demonstrate 5/5 right shoulder MMT in flexion and scaption in order to perform ADLs with greater ease  Current:  Flex: 3+/5  Scap: 3+/5  Current: Remains, Flexion MMT 3+/5, Scaption MMT 3+/5, 5/19/2020    PLAN  [x]  Upgrade activities as tolerated     [x]  Continue plan of care  []  Update interventions per flow sheet       []  Discharge due to:_  []  Other:_      Iveth Lewis PT 5/19/2020  10:23 AM    Future Appointments   Date Time Provider Robert Guevara   5/19/2020  3:30 PM Paris Poles, PT [facility]   5/21/2020 12:30 PM Paris Poles, PT [facility]   5/26/2020  3:45 PM Paris Poles, PT [facility]   5/29/2020  1:15 PM Gen Paul, PT [facility]   6/2/2020  3:45 PM Gen Paul, PT [facility]   6/3/2020 10:30 AM JOSHUA Melgoza [facility] [address]

## 2020-05-21 ENCOUNTER — HOSPITAL ENCOUNTER (OUTPATIENT)
Dept: PHYSICAL THERAPY | Age: 60
Discharge: HOME | End: 2020-05-21
Payer: MEDICAID

## 2020-05-21 PROCEDURE — 97112 NEUROMUSCULAR REEDUCATION: CPT

## 2020-05-21 PROCEDURE — 97530 THERAPEUTIC ACTIVITIES: CPT

## 2020-05-21 PROCEDURE — 97110 THERAPEUTIC EXERCISES: CPT

## 2020-05-21 NOTE — PROGRESS NOTES
PT DAILY TREATMENT NOTE 10-18    Patient Name: Bassam Johns  Date:2020  : 1960  [x]  Patient  Verified  Payor: BLUE CROSS MEDICAID / Plan: [facility] [employer]KEEPERS PLUS / Product Type: Managed Care Medicaid /    In time:1234  Out time:125  Total Treatment Time (min): 51  Visit #: 4 of 8    Medicare/BCBS Only   Total Timed Codes (min):  51 1:1 Treatment Time:  51       Treatment Area: Strain of muscle(s) and tendon(s) of the rotator cuff of right shoulder, subsequent encounter [S46.011D]    SUBJECTIVE  Pain Level (0-10 scale): 2  Any medication changes, allergies to medications, adverse drug reactions, diagnosis change, or new procedure performed?: [x] No    [] Yes (see summary sheet for update)  Subjective functional status/changes:   [] No changes reported  Patient reports no adverse response to last treatment session. OBJECTIVE    20 min Therapeutic Exercise:  [x]? See flow sheet : Emphasis placed on improving available shoulder AROM and UE strength   Rationale: increase ROM and increase strength to improve the patients ability to improve ease with overhead ADLs     10 min Therapeutic Activity:  [x]? See flow sheet : Emphasis placed on improving UE strength and ROM with elevation   Rationale: increase ROM and increase strength  to improve the patients ability to improve ease with overhead reaching. 21 min Neuromuscular Re-education:  [x]?   See flow sheet : Emphasis placed on improving activation and recruitment of the glenohumeral and scapulothoracic musculature and improving scapulohumeral rhythm with UE elevation   Rationale: increase ROM, increase strength and increase proprioception  to improve the patients ability to improve ease with self-care ADLs        With   [] TE   [] TA   [] neuro   [] other: Patient Education: [x] Review HEP    [] Progressed/Changed HEP based on:   [] positioning   [] body mechanics   [] transfers   [] heat/ice application    [] other:      Other Objective/Functional Measures: Ability to place cone on overhead shelf without Upper trapezius compensation     Pain Level (0-10 scale) post treatment: 0    ASSESSMENT/Changes in Function: Patient continues to benefit from frequent verbal cuing to promote correct scapulohumeral mechanics with UE elevation with continued spinal compensation. Patient will continue to benefit from skilled PT services to modify and progress therapeutic interventions, address functional mobility deficits, address ROM deficits, address strength deficits, analyze and address soft tissue restrictions, analyze and cue movement patterns, analyze and modify body mechanics/ergonomics and assess and modify postural abnormalities to attain remaining goals. []  See Plan of Care  []  See progress note/recertification  []  See Discharge Summary         Progress towards goals / Updated goals:    Long Term Goals: To be accomplished in 6 weeks:  1. Patient's pain level will be 2-5/10 with activity in order to improve patient's ability to perform normal ADLs. PN: Met, Pain range 0/10. 3/23/20  2. Patient will demonstrate AROM right shoulder flex 0-120, scaption 0-90, IR 0-30, ER 0-40 to increase ease of ADLs.    PN:  AROM right shoulder flexion 0-60 deg, scaption 0-62 deg, functional ER to back of her neck/occiput, functional IR = T10, 3/27/2020.  Regressing, loss of 20 flexion. Current: Progressing: AROM right shoulder flexion 0-131 deg, scaption 0-85 deg, functional ER to back of her neck/occiput, functional IR = T10, 04/30/20  3. Patient will increase FOTO score to 63 to indicate increased functional mobility. PN:  FOTO = 49, no change since 2/25/20. Current: Goal Met, 66, 04/30/20  4. Patient will be able to reach to the bottom shelf of a kitchen cabinet in order to perform normal ADLs. PN: Met,  Pt is able to reach the second shelf in therapy cabinet.    5.  Patient will demonstrate 5/5 right shoulder MMT in flexion and scaption in order to perform ADLs with greater ease  Current:  Flex: 3+/5  Scap: 3+/5  Current: Remains, Flexion MMT 3+/5, Scaption MMT 3+/5, 5/19/2020    PLAN  [x]  Upgrade activities as tolerated     [x]  Continue plan of care  []  Update interventions per flow sheet       []  Discharge due to:_  []  Other:_      Dontae No, PT 5/21/2020  8:03 AM    Future Appointments   Date Time Provider Robert Guevara   5/21/2020 12:30 PM Matthew Leo, PT [facility]   5/26/2020  3:45 PM Matthew Leo, PT [facility]   5/29/2020  1:15 PM Douglas Campbell, PT [facility]   6/2/2020  3:45 PM Douglas Campbell, PT [facility]   6/3/2020 10:30 AM JOSHUA Mathur

## 2020-05-29 ENCOUNTER — HOSPITAL ENCOUNTER (OUTPATIENT)
Dept: PHYSICAL THERAPY | Age: 60
Discharge: HOME | End: 2020-05-29
Payer: MEDICAID

## 2020-05-29 PROCEDURE — 97112 NEUROMUSCULAR REEDUCATION: CPT | Performed by: PHYSICAL THERAPIST

## 2020-05-29 PROCEDURE — 97110 THERAPEUTIC EXERCISES: CPT | Performed by: PHYSICAL THERAPIST

## 2020-05-29 PROCEDURE — 97530 THERAPEUTIC ACTIVITIES: CPT | Performed by: PHYSICAL THERAPIST

## 2020-05-29 NOTE — PROGRESS NOTES
Physical Therapy Discharge Instructions      [facility] - [facility]   [address] [facility], [address]   [phone] fax    Patient: Florinda Cerda  : 1960      Continue Home Exercise Program 3-4 times per week      Continue with    [x] Ice  as needed       [x] Heat           Follow up with MD:     [x] Upon completion of therapy       Recommendations:     [x]   Return to activity with home program        Additional Comments: Your next appointment with JOSHUA Tran is on 6/3/2020 at 10:30 am.          Domitila Bonilla, PT 2020 1:48 PM

## 2020-05-29 NOTE — PROGRESS NOTES
PT DAILY TREATMENT NOTE 10-18    Patient Name: Jamie Pavon  Date:2020  : 1960  [x]  Patient  Verified  Payor: BLUE CROSS MEDICAID / Plan: [facility] [employer] HE[employer]KEEPERS PLUS / Product Type: Managed Care Medicaid /    In time:1:25  Out time:2:10  Total Treatment Time (min): 45  Visit #: 5 of 8    Medicare/BCBS Only   Total Timed Codes (min):  45 1:1 Treatment Time:  45       Treatment Area: Strain of muscle(s) and tendon(s) of the rotator cuff of right shoulder, subsequent encounter [S46.011D]    SUBJECTIVE  Pain Level (0-10 scale): 0  Any medication changes, allergies to medications, adverse drug reactions, diagnosis change, or new procedure performed?: [x] No    [] Yes (see summary sheet for update)  Subjective functional status/changes:   [] No changes reported  Patient denies pain however, she states that her shoulder is \"stiff\". She notes better mobility and states, \"I think I can be done\". OBJECTIVE    15 min Therapeutic Exercise:  [] See flow sheet : Emphasis placed on improving available shoulder AROM and UE strength   Rationale: increase ROM and increase strength to improve the patients ability to increase patients ADLs. 15 min Therapeutic Activity:  []  See flow sheet :Emphasis placed on UE strength and mobility to be able to lift, carry, push and pull   Rationale: increase strength and improve coordination  to improve the patients ability to increase ease with overhead reaching/lifting; carrying her grandchildren. 15 min Neuromuscular Re-education:  []  See flow sheet :   Rationale: increase ROM, increase strength and increase proprioception  to improve the patients ability to perform self care ADLs. With   [] TE   [] TA   [] neuro   [] other: Patient Education: [x] Review HEP    [] Progressed/Changed HEP based on:   [] positioning   [] body mechanics   [] transfers   [] heat/ice application    [] other:      Other Objective/Functional Measures: PROM is [facility].   Strength is 4/5 for elevation. Pain Level (0-10 scale) post treatment: 0    ASSESSMENT/Changes in Function: Patient has improved function and decreased pain. Functionally, she notes she is independent. [x]  See Discharge Summary         Progress towards goals / Updated goals:  1. Patient's pain level will be 2-5/10 with activity in order to improve patient's ability to perform normal ADLs. PN: Met, Pain range 0/10. 3/23/20  2. Patient will demonstrate AROM right shoulder flex 0-120, scaption 0-90, IR 0-30, ER 0-40 to increase ease of ADLs.    PN:  AROM right shoulder flexion 0-60 deg, scaption 0-62 deg, functional ER to back of her neck/occiput, functional IR = T10, 3/27/2020.  Regressing, loss of 20 flexion. Current: Progressing: AROM right shoulder flexion 0-150 deg, scaption 0-102 deg, ER 0-65, IR 0-50.   5/29/20. Goal Met.  3. Patient will increase FOTO score to 63 to indicate increased functional mobility. PN:  FOTO = 49, no change since 2/25/20. Current: Goal Met, 66, 04/30/20  4. Patient will be able to reach to the bottom shelf of a kitchen cabinet in order to perform normal ADLs. PN: Met,  Pt is able to reach the second shelf in therapy cabinet. Current:  Patient can reach up into her kitchen cabinet to the top shelf.  5/29/20. Goal Met. 5.  Patient will demonstrate 5/5 right shoulder MMT in flexion and scaption in order to perform ADLs with greater ease  Current:  Flex: 3+/5  Scap: 3+/5  Current: Remains, Flexion MMT 4/5, Scaption MMT 4/5, 5/29/2020    PLAN  []  Upgrade activities as tolerated     []  Continue plan of care  []  Update interventions per flow sheet       [x]  Discharge due to: Goal met, no functional limitations.   []  Other:_      Natasha Fitzgerald PT 5/29/2020  1:25 PM    Future Appointments   Date Time Provider oRbert Guevara   6/3/2020 10:30 AM JOSHUA Javier

## 2020-05-29 NOTE — PROGRESS NOTES
[facility]              [address] [facility] [place], [address]   [phone] fax    Discharge Summary  Patient name: Gini Rucker Start of Care: 2020   Referral source: Murtaza Hernandez Paogertrudisma : 1960   Medical/Treatment Diagnosis: Strain of muscle(s) and tendon(s) of the rotator cuff of right shoulder, subsequent encounter [S46.011D]  Payor: BLUE CROSS MEDICAID / Plan: [employer] / Product Type: Managed Care Medicaid /  Onset Date:2019     Prior Hospitalization: see medical history Provider#: [id number]   Medications: Verified on Patient Summary List    CComorbidities: Back Pain, Depression, HBP, Headaches, Sleep Dysfunction. Prior Level of Function: Independent. 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 Visits from Start of Care: 22    Missed Visits: 14  Reporting Period : 20 to 30    Summary of Care:  Goal: Patient's pain level will be 2-5/10 with activity in order to improve patient's ability to perform normal ADLs. Status at last note/certification: Pain range 0/10   Status at discharge: Pain range 0/10. Goal Met. Goal: Patient will demonstrate AROM right shoulder flex 0-120, scaption 0-90, IR 0-30, ER 0-40 to increase ease of ADLs.    Status at last note/certification: AROM right shoulder flexion 0-131 deg, scaption 0-85 deg, functional ER to back of her neck/occiput, functional IR = T10  Status at discharge: AROM right shoulder flexion 0-150 deg, scaption 0-102 deg, ER 0-65, IR 0-50.   20. Goal Met. Goal: Patient will increase FOTO score to 63 to indicate increased functional mobility. Status at last note/certification:  66  Status at discharge: 66.  Goal Met. Goal: Patient will be able to reach to the bottom shelf of a kitchen cabinet in order to perform normal ADLs. Status at last note/certification: Pt is able to reach the second shelf in therapy cabinet. Status at discharge: Patient can reach up into her kitchen cabinet to the top shelf. Goal Met. Goal: Patient will demonstrate 5/5 right shoulder MMT in flexion and scaption in order to perform ADLs with greater ease  Status at last note/certification: Flex: 3+/5  Scap: 3+/5  Status at discharge: Flexion MMT 4/5, Scaption MMT 4/5, progressing, not met. ASSESSMENT/RECOMMENDATIONS:  [x]Discontinue therapy: [x]Patient has reached or is progressing toward set goals          Titi Bond, PT 5/29/2020 1:37 PM    NOTE TO PHYSICIAN:  Please complete the following and fax to: [facility] at [phone]  . Retain this original for your records. If you are unable to process this request in   24 hours, please contact our office.      [] I have read the above report and request that my patient continue therapy with the following changes/special instructions:  [] I have read the above report and request that my patient be discharged from therapy    Physician's Signature:____________Date:_________TIME:________    ** Signature, Date and Time must be completed for valid certification **

## 2020-06-02 ENCOUNTER — APPOINTMENT (OUTPATIENT)
Dept: PHYSICAL THERAPY | Age: 60
End: 2020-06-02

## 2020-06-03 ENCOUNTER — OFFICE VISIT (OUTPATIENT)
Dept: ORTHOPEDIC SURGERY | Age: 60
End: 2020-06-03

## 2020-06-03 VITALS
SYSTOLIC BLOOD PRESSURE: 163 MMHG | DIASTOLIC BLOOD PRESSURE: 81 MMHG | BODY MASS INDEX: 21.97 KG/M2 | OXYGEN SATURATION: 99 % | RESPIRATION RATE: 16 BRPM | TEMPERATURE: 98.2 F | HEART RATE: 55 BPM | WEIGHT: 140 LBS | HEIGHT: 67 IN

## 2020-06-03 DIAGNOSIS — Z98.890 STATUS POST ARTHROSCOPY OF RIGHT SHOULDER: ICD-10-CM

## 2020-06-03 DIAGNOSIS — S46.011D TRAUMATIC COMPLETE TEAR OF RIGHT ROTATOR CUFF, SUBSEQUENT ENCOUNTER: Primary | ICD-10-CM

## 2020-09-01 RX ORDER — DICLOFENAC SODIUM 50 MG/1
TABLET, DELAYED RELEASE ORAL
Qty: 60 TAB | Refills: 4 | Status: SHIPPED | OUTPATIENT
Start: 2020-09-01

## 2020-11-09 ENCOUNTER — TELEPHONE (OUTPATIENT)
Dept: ORTHOPEDIC SURGERY | Age: 60
End: 2020-11-09

## 2020-11-09 NOTE — TELEPHONE ENCOUNTER
Patient called for . Patient said she had Surgery on her Right Shoulder done last year by Mir Jones. Patient said that she needs a card or something for when she [place]. That she is in [place] with her brother , who is in Hospice. That she will be back in [place] next week. That she will be able to  from the [facility] location. Patient tel. [phone].

## 2021-12-08 NOTE — TELEPHONE ENCOUNTER
I sent in tramadol to her pharmacy    Please call her and let her know    Thanks Do Not Resuscitate (DNR)/Medical Orders for Life-Sustaining Treatment (MOLST)

## 2022-01-01 NOTE — PROGRESS NOTES
Nicole Mejia  1960     HISTORY OF PRESENT ILLNESS  Nicole Mejia is a 64 y.o. female who presents today for evaluation s/p Right shoulder arthroscopic rotator cuff repair, biceps tenodesis and subacromial decompression on 12/3/19. Patient has been going to PT. Describes pain as a 0/10. She is doing very well today. She has finished PT at this point and working on a HEP. She continues to make progress with her motion and strength. She feels great today. Patient denies any fever, chills, chest pain, shortness of breath or calf pain. There are no new illness or injuries to report since last seen in the office. PHYSICAL EXAM:   Visit Vitals  /81 (BP 1 Location: Left arm, BP Patient Position: Sitting)   Pulse (!) 55   Temp 98.2 °F (36.8 °C) (Temporal)   Resp 16   Ht 5' 7\" (1.702 m)   Wt 140 lb (63.5 kg)   SpO2 99%   BMI 21.93 kg/m²      The patient is a well-developed, well-nourished female in no acute distress. The patient is alert and oriented times three. The patient appears to be well groomed. Mood and affect are normal.  ORTHOPEDIC EXAM of right shoulder:  Inspection: swelling not present,  Bruising not present  Incision well healed  Passive glenohumeral abduction 0-90 degrees, 180 PFF, 50 PER, 180 AFF, IR midthoracic  Stability: Stable  Strength: gentle rotator cuff testing without pain or weakness  2+ distal pulses    IMPRESSION:  s/p Right shoulder arthroscopic rotator cuff repair, biceps tenodesis and subacromial decompression     PLAN:   Pt doing well post operatively  Her ROM and strength look great today. She is doing very well today. She has finished PT and will continue her HEP. She will work on her ER since she is a little stiff in that motion. She will continue OTC pain medications for occasional discomfort. Stressed to patient that nothing causes an increase in pain. Pt not given refill of pain medications.   RTC PRN    Patient seen and evaluated by Dr. Priya Gavin today who agrees with treatment plan    Artur Juarez [facility] EOAE (evoked otoacoustic emission)

## (undated) DEVICE — CANNULA THREADED FLEX 8.0 X 72MM: Brand: CLEAR-TRAC

## (undated) DEVICE — BLADE RMFG SHVR 4.0MM TOMCAT --

## (undated) DEVICE — DRSG GZ OIL EMUL CURAD 3X3 --

## (undated) DEVICE — STERILE POLYISOPRENE POWDER-FREE SURGICAL GLOVES: Brand: PROTEXIS

## (undated) DEVICE — 3M™ COBAN™ SELF-ADHERENT WRAP, 1586S, STERILE, 6 IN X 5 YD (15 CM X 4,5 M), 12 ROLLS/CASE: Brand: 3M™ COBAN™

## (undated) DEVICE — BLANKET WRM W40.2XL55.9IN IORT LO BODY + MISTRAL AIR

## (undated) DEVICE — DRAPE,REIN 53X77,STERILE: Brand: MEDLINE

## (undated) DEVICE — BUR SHV CUT HLLW 6 FLUT 5.5MM --

## (undated) DEVICE — STOCKINETTE IMPERV REG 9X48IN --

## (undated) DEVICE — Device

## (undated) DEVICE — SUTURE FIBERTAPE FIBERWIRE SZ 2-0 30IN NONABSORB BLU AR72377

## (undated) DEVICE — SOLUTION IRRIG 3000ML 0.9% SOD CHL FLX CONT 0797208] ICU MEDICAL INC]

## (undated) DEVICE — T-MAX DISPOSABLE FACE MASK 8 PER BOX

## (undated) DEVICE — KIT CLN UP BON SECOURS MARYV

## (undated) DEVICE — GARMENT,MEDLINE,DVT,SEQUENTIAL,CALF,MD: Brand: MEDLINE

## (undated) DEVICE — SOFT SILICONE HYDROCELLULAR SACRUM DRESSING WITH LOCK AWAY LAYER: Brand: ALLEVYN LIFE SACRUM (LARGE) PACK OF 10

## (undated) DEVICE — 4-PORT MANIFOLD: Brand: NEPTUNE 2

## (undated) DEVICE — SPONGE LAP 18X18IN STRL -- 5/PK

## (undated) DEVICE — 90-S MAX, SUCTION PROBE, NON-BENDABLE, MAX CUT LEVEL 11: Brand: SERFAS ENERGY

## (undated) DEVICE — (D)PREP SKN CHLRAPRP APPL 26ML -- CONVERT TO ITEM 371833

## (undated) DEVICE — 1010 S-DRAPE TOWEL DRAPE 10/BX: Brand: STERI-DRAPE™

## (undated) DEVICE — GAUZE,SPONGE,4"X4",16PLY,STRL,LF,10/TRAY: Brand: MEDLINE

## (undated) DEVICE — BLANKET WRM AD W50XL85.8IN PACU FULL BODY FORC AIR

## (undated) DEVICE — KIT INSTR W/ 2.4MM GUIDEPIN SUT PASS WIRE NO2 FIBERWIRE

## (undated) DEVICE — SHOULDER STABILIZATION KIT,                                    DISPOSABLE 12 PER BOX

## (undated) DEVICE — PAD,ABDOMINAL,8"X10",ST,LF: Brand: MEDLINE

## (undated) DEVICE — DISPOSABLE MULTI BAG ADAPTERS Y                                    TUBING, STERILE, 2 TO A SET 6 SETS                                    PER BOX

## (undated) DEVICE — 3M™ STERI-DRAPE™ U-DRAPE 1015: Brand: STERI-DRAPE™

## (undated) DEVICE — 3M™ MEDIPORE™ H SOFT CLOTH SURGICAL TAPE 2862, 2 INCH X 10 YARD (5CM X 9,1M), 12 ROLLS/CASE: Brand: 3M™ MEDIPORE™

## (undated) DEVICE — SUTURE ETHLN SZ 2-0 L18IN NONABSORBABLE BLK L19MM PS-2 PRIM 593H

## (undated) DEVICE — [ARTHROSCOPY PUMP,  DO NOT USE IF PACKAGE IS DAMAGED,  KEEP DRY,  KEEP AWAY FROM SUNLIGHT,  PROTECT FROM HEAT AND RADIOACTIVE SOURCES.]: Brand: FLOSTEADY

## (undated) DEVICE — DRESSING,GAUZE,XEROFORM,CURAD,1"X8",ST: Brand: CURAD

## (undated) DEVICE — BANDAGE,GAUZE,BULKEE II,4.5"X4.1YD,STRL: Brand: MEDLINE

## (undated) DEVICE — NEEDLE SPNL 22GA L3.5IN BLK HUB S STL REG WALL FIT STYL W/

## (undated) DEVICE — CANNULA ARTHSCP L3CM ID8MM DBL DAM 1 PC MOLD LO PROF FLNG